# Patient Record
Sex: FEMALE | Race: OTHER | NOT HISPANIC OR LATINO | URBAN - METROPOLITAN AREA
[De-identification: names, ages, dates, MRNs, and addresses within clinical notes are randomized per-mention and may not be internally consistent; named-entity substitution may affect disease eponyms.]

---

## 2019-06-29 ENCOUNTER — EMERGENCY (EMERGENCY)
Facility: HOSPITAL | Age: 39
LOS: 1 days | Discharge: HOME | End: 2019-06-29
Attending: EMERGENCY MEDICINE | Admitting: EMERGENCY MEDICINE
Payer: SELF-PAY

## 2019-06-29 VITALS
RESPIRATION RATE: 18 BRPM | DIASTOLIC BLOOD PRESSURE: 75 MMHG | TEMPERATURE: 98 F | OXYGEN SATURATION: 100 % | SYSTOLIC BLOOD PRESSURE: 125 MMHG | HEART RATE: 108 BPM

## 2019-06-29 PROCEDURE — 99283 EMERGENCY DEPT VISIT LOW MDM: CPT | Mod: 25

## 2019-06-29 PROCEDURE — 76815 OB US LIMITED FETUS(S): CPT | Mod: 26

## 2019-06-29 PROCEDURE — 99053 MED SERV 10PM-8AM 24 HR FAC: CPT

## 2019-06-29 NOTE — ED ADULT TRIAGE NOTE - CHIEF COMPLAINT QUOTE
Pt Armenian speaking,  family chosen to translate.  Pt states she is 8 weeks pregnant .  Pt with vaginal bleeding and pelvic pain x 1 day.

## 2019-06-30 VITALS
SYSTOLIC BLOOD PRESSURE: 105 MMHG | OXYGEN SATURATION: 100 % | TEMPERATURE: 97 F | RESPIRATION RATE: 16 BRPM | HEART RATE: 86 BPM | DIASTOLIC BLOOD PRESSURE: 65 MMHG

## 2019-06-30 LAB
ALBUMIN SERPL ELPH-MCNC: 3.8 G/DL — SIGNIFICANT CHANGE UP (ref 3.3–5)
ALP SERPL-CCNC: 73 U/L — SIGNIFICANT CHANGE UP (ref 40–120)
ALT FLD-CCNC: 15 U/L — SIGNIFICANT CHANGE UP (ref 4–33)
ANION GAP SERPL CALC-SCNC: 14 MMO/L — SIGNIFICANT CHANGE UP (ref 7–14)
APPEARANCE UR: SIGNIFICANT CHANGE UP
APTT BLD: 25.2 SEC — LOW (ref 27.5–36.3)
AST SERPL-CCNC: 15 U/L — SIGNIFICANT CHANGE UP (ref 4–32)
BACTERIA # UR AUTO: NEGATIVE — SIGNIFICANT CHANGE UP
BASOPHILS # BLD AUTO: 0.05 K/UL — SIGNIFICANT CHANGE UP (ref 0–0.2)
BASOPHILS NFR BLD AUTO: 0.4 % — SIGNIFICANT CHANGE UP (ref 0–2)
BILIRUB SERPL-MCNC: 0.2 MG/DL — SIGNIFICANT CHANGE UP (ref 0.2–1.2)
BILIRUB UR-MCNC: NEGATIVE — SIGNIFICANT CHANGE UP
BLD GP AB SCN SERPL QL: NEGATIVE — SIGNIFICANT CHANGE UP
BLOOD UR QL VISUAL: SIGNIFICANT CHANGE UP
BUN SERPL-MCNC: 7 MG/DL — SIGNIFICANT CHANGE UP (ref 7–23)
CALCIUM SERPL-MCNC: 8.7 MG/DL — SIGNIFICANT CHANGE UP (ref 8.4–10.5)
CHLORIDE SERPL-SCNC: 101 MMOL/L — SIGNIFICANT CHANGE UP (ref 98–107)
CO2 SERPL-SCNC: 22 MMOL/L — SIGNIFICANT CHANGE UP (ref 22–31)
COLOR SPEC: SIGNIFICANT CHANGE UP
CREAT SERPL-MCNC: 0.52 MG/DL — SIGNIFICANT CHANGE UP (ref 0.5–1.3)
EOSINOPHIL # BLD AUTO: 0.14 K/UL — SIGNIFICANT CHANGE UP (ref 0–0.5)
EOSINOPHIL NFR BLD AUTO: 1.1 % — SIGNIFICANT CHANGE UP (ref 0–6)
GLUCOSE SERPL-MCNC: 183 MG/DL — HIGH (ref 70–99)
GLUCOSE UR-MCNC: >1000 — HIGH
HCG SERPL-ACNC: SIGNIFICANT CHANGE UP MIU/ML
HCT VFR BLD CALC: 35.3 % — SIGNIFICANT CHANGE UP (ref 34.5–45)
HGB BLD-MCNC: 11.2 G/DL — LOW (ref 11.5–15.5)
HYALINE CASTS # UR AUTO: NEGATIVE — SIGNIFICANT CHANGE UP
IMM GRANULOCYTES NFR BLD AUTO: 0.5 % — SIGNIFICANT CHANGE UP (ref 0–1.5)
INR BLD: 1 — SIGNIFICANT CHANGE UP (ref 0.88–1.17)
KETONES UR-MCNC: NEGATIVE — SIGNIFICANT CHANGE UP
LEUKOCYTE ESTERASE UR-ACNC: NEGATIVE — SIGNIFICANT CHANGE UP
LYMPHOCYTES # BLD AUTO: 2.65 K/UL — SIGNIFICANT CHANGE UP (ref 1–3.3)
LYMPHOCYTES # BLD AUTO: 20.8 % — SIGNIFICANT CHANGE UP (ref 13–44)
MCHC RBC-ENTMCNC: 25.5 PG — LOW (ref 27–34)
MCHC RBC-ENTMCNC: 31.7 % — LOW (ref 32–36)
MCV RBC AUTO: 80.4 FL — SIGNIFICANT CHANGE UP (ref 80–100)
MONOCYTES # BLD AUTO: 0.64 K/UL — SIGNIFICANT CHANGE UP (ref 0–0.9)
MONOCYTES NFR BLD AUTO: 5 % — SIGNIFICANT CHANGE UP (ref 2–14)
NEUTROPHILS # BLD AUTO: 9.18 K/UL — HIGH (ref 1.8–7.4)
NEUTROPHILS NFR BLD AUTO: 72.2 % — SIGNIFICANT CHANGE UP (ref 43–77)
NITRITE UR-MCNC: NEGATIVE — SIGNIFICANT CHANGE UP
NRBC # FLD: 0 K/UL — SIGNIFICANT CHANGE UP (ref 0–0)
PH UR: 7.5 — SIGNIFICANT CHANGE UP (ref 5–8)
PLATELET # BLD AUTO: 241 K/UL — SIGNIFICANT CHANGE UP (ref 150–400)
PMV BLD: 10.2 FL — SIGNIFICANT CHANGE UP (ref 7–13)
POTASSIUM SERPL-MCNC: 3.5 MMOL/L — SIGNIFICANT CHANGE UP (ref 3.5–5.3)
POTASSIUM SERPL-SCNC: 3.5 MMOL/L — SIGNIFICANT CHANGE UP (ref 3.5–5.3)
PROT SERPL-MCNC: 7.1 G/DL — SIGNIFICANT CHANGE UP (ref 6–8.3)
PROT UR-MCNC: 10 — SIGNIFICANT CHANGE UP
PROTHROM AB SERPL-ACNC: 11.1 SEC — SIGNIFICANT CHANGE UP (ref 9.8–13.1)
RBC # BLD: 4.39 M/UL — SIGNIFICANT CHANGE UP (ref 3.8–5.2)
RBC # FLD: 14.6 % — HIGH (ref 10.3–14.5)
RBC CASTS # UR COMP ASSIST: SIGNIFICANT CHANGE UP (ref 0–?)
RH IG SCN BLD-IMP: POSITIVE — SIGNIFICANT CHANGE UP
SODIUM SERPL-SCNC: 137 MMOL/L — SIGNIFICANT CHANGE UP (ref 135–145)
SP GR SPEC: 1.02 — SIGNIFICANT CHANGE UP (ref 1–1.04)
SQUAMOUS # UR AUTO: SIGNIFICANT CHANGE UP
UROBILINOGEN FLD QL: NORMAL — SIGNIFICANT CHANGE UP
WBC # BLD: 12.73 K/UL — HIGH (ref 3.8–10.5)
WBC # FLD AUTO: 12.73 K/UL — HIGH (ref 3.8–10.5)
WBC UR QL: SIGNIFICANT CHANGE UP (ref 0–?)

## 2019-06-30 NOTE — ED PROCEDURE NOTE - PROCEDURE ADDITIONAL DETAILS
Limited TA US of pregnancy shows a Live IUP measuring 9 weeks 4 days by CRL with FHB 171bpm. See images and report in chart.  Fish [id number]

## 2019-06-30 NOTE — ED PROVIDER NOTE - NS ED ROS FT
CONSTITUTIONAL: No fevers, no chills  Eyes: no visual changes  Ears: no ear drainage, no ear pain  Nose: no nasal congestion  Mouth/Throat: no sore throat  Cardiovascular: No Chest pain  Respiratory: No SOB  Gastrointestinal: No n/v/d, no abd pain  Genitourinary: vaginal bleeding.   SKIN: no rashes.  NEURO: no headache  PSYCHIATRIC: no known mental health issues.

## 2019-06-30 NOTE — ED PROVIDER NOTE - CLINICAL SUMMARY MEDICAL DECISION MAKING FREE TEXT BOX
First trimester vaginal bleeding - hemodynamically stable & well appearing, bedside sono with  +, will check basics + UA + type, anticipate d/c with GYN f/u .

## 2019-06-30 NOTE — ED PROVIDER NOTE - CARE PLAN
Principal Discharge DX:	Threatened miscarriage Principal Discharge DX:	Vaginal bleeding affecting early pregnancy

## 2019-06-30 NOTE — ED PROVIDER NOTE - OBJECTIVE STATEMENT
id: [id number]    37yo f  at ~ 8 wks p/w vaginal bleeding. Two pads today. + cramping. Previous sono during this pregnancy w/ IUP. See's Dr. Christianson. No known fevers, chills, urinary symptoms.

## 2019-06-30 NOTE — ED ADULT NURSE NOTE - CHIEF COMPLAINT QUOTE
Pt Turkmen speaking,  family chosen to translate.  Pt states she is 8 weeks pregnant .  Pt with vaginal bleeding and pelvic pain x 1 day.

## 2019-06-30 NOTE — ED PROVIDER NOTE - ATTENDING CONTRIBUTION TO CARE
Mccann: 38 yof  appx 8 weeks with IUP at gyn office. Pt complaining of abd crampy pain and vaginal bleeding today. Pain has improved now, no urinary sxs, no nausea, no vomiting. On exam, pt is well appearing, not pale, no distress, clear lungs, normal cardiac, abd soft and non tender, no cvat. LABS, US shows Live IUP, threatened ab - outpt gyn f/u, return precautions.

## 2019-06-30 NOTE — ED PROVIDER NOTE - NSFOLLOWUPINSTRUCTIONS_ED_ALL_ED_FT
Log Out.    [employer]® CareNotes®     :  [facility]             THREATENED MISCARRIAGE - AfterCare(R) Instructions(ER/ED)     Threatened Miscarriage    WHAT YOU NEED TO KNOW:    A threatened miscarriage occurs when you have vaginal bleeding within the first 20 weeks of pregnancy. It means that a miscarriage may happen. A threatened miscarriage may also be called a threatened .     DISCHARGE INSTRUCTIONS:    Return to the emergency department if:     You feel weak or faint.       Your pain or cramping in your abdomen or back gets worse.       You have vaginal bleeding that soaks 1 or more pads in an hour.       You pass material that looks like tissue or large clots.     Contact your healthcare provider or obstetrician if:     You have a fever.       You have trouble urinating, burning when you urinate, or feel a need to urinate often.      You have new or worsening vaginal bleeding.      You have vaginal pain or itching, or vaginal discharge that is yellow, green, or foul-smelling.       You have questions or concerns about your condition or care.    Self-care: The following may help you manage your symptoms and decrease your risk for a miscarriage:     Do not put anything in your vagina. Do not have sex, douche, or use tampons. These actions may increase your risk for infection and miscarriage.       Rest as directed. Do not exercise or do strenuous activities. These activities may cause  labor or miscarriage. Ask your healthcare provider what activities are okay to do.     Stay healthy during pregnancy:     Eat a variety of healthy foods. Healthy foods can help you get extra protein, water, and calories that you need while you are pregnant. Healthy foods include fruits, vegetables, whole-grain breads, low-fat dairy products, beans, lean meats, and fish. Avoid raw or undercooked meat and fish. Ask your healthcare provider if you need a special diet.       Take prenatal vitamins as directed. These help you get the right amount of vitamins and minerals. They may also decrease the risk of certain birth defects.      Do not drink alcohol or use illegal drugs. These can increase your risk for a miscarriage or harm your baby.       Do not smoke. Nicotine and other chemicals in cigarettes and cigars can harm your baby and cause miscarriage or  labor. Ask your healthcare provider for information if you currently smoke and need help to quit. E-cigarettes or smokeless tobacco still contain nicotine. Do not use these products.       Decrease your risk for an infection. Always wash your hands before eating or preparing meals. Do not spend time with people who are sick. Ask your healthcare provider if you need immunizations such as the flu or hepatitis B vaccine. Immunizations may decrease your risk for infections that could cause a miscarriage.       Manage your medical conditions. Keep your blood pressure and blood sugars under control. Maintain a healthy weight during pregnancy.     Follow up with your obstetrician as directed: You may need to see your obstetrician frequently for ultrasounds or blood tests. Write down your questions so you remember to ask them during your visits.       © Copyright [employer] 2019 All illustrations and images included in CareNotes are the copyrighted property of [employer] or [employer].      back to top                      © Copyright [employer] 2019

## 2019-06-30 NOTE — ED PROVIDER NOTE - PHYSICAL EXAMINATION
GEN - NAD; well appearing; A+O x3   HEAD - NC/AT     EYES - EOMI, no conjunctival pallor, no scleral icterus  ENT -   mucous membranes  moist , no discharge      NECK - Neck supple  PULM - CTA b/l,  symmetric breath sounds  COR -  RRR, S1 S2, no murmurs  ABD - , ND, NT, soft, no guarding, no rebound, no masses   BACK - no CVA tenderness, nontender spine     EXTREMS - no edema, no deformity, warm and well perfused    SKIN - no rash or bruising      NEUROLOGIC - alert, sensation nl, motor 5/5 RUE/LUE/RLE/LLE

## 2019-09-25 PROBLEM — Z00.00 ENCOUNTER FOR PREVENTIVE HEALTH EXAMINATION: Status: ACTIVE | Noted: 2019-09-25

## 2019-10-03 ENCOUNTER — APPOINTMENT (OUTPATIENT)
Dept: MATERNAL FETAL MEDICINE | Facility: CLINIC | Age: 39
End: 2019-10-03
Payer: MEDICAID

## 2019-10-03 ENCOUNTER — ASOB RESULT (OUTPATIENT)
Age: 39
End: 2019-10-03

## 2019-10-03 PROCEDURE — G0108 DIAB MANAGE TRN  PER INDIV: CPT

## 2019-10-21 ENCOUNTER — APPOINTMENT (OUTPATIENT)
Dept: MATERNAL FETAL MEDICINE | Facility: CLINIC | Age: 39
End: 2019-10-21
Payer: MEDICAID

## 2019-10-21 ENCOUNTER — APPOINTMENT (OUTPATIENT)
Dept: ANTEPARTUM | Facility: CLINIC | Age: 39
End: 2019-10-21
Payer: MEDICAID

## 2019-10-21 ENCOUNTER — ASOB RESULT (OUTPATIENT)
Age: 39
End: 2019-10-21

## 2019-10-21 DIAGNOSIS — O24.112 PRE-EXISTING TYPE 2 DIABETES MELLITUS, IN PREGNANCY, SECOND TRIMESTER: ICD-10-CM

## 2019-10-21 PROCEDURE — G0108 DIAB MANAGE TRN  PER INDIV: CPT

## 2019-10-21 PROCEDURE — 76811 OB US DETAILED SNGL FETUS: CPT

## 2019-10-28 RX ORDER — LANCETS
EACH MISCELLANEOUS
Qty: 2 | Refills: 0 | Status: ACTIVE | COMMUNITY
Start: 2019-10-21 | End: 1900-01-01

## 2019-10-28 RX ORDER — BLOOD SUGAR DIAGNOSTIC
STRIP MISCELLANEOUS 4 TIMES DAILY
Qty: 2 | Refills: 3 | Status: ACTIVE | COMMUNITY
Start: 2019-10-21 | End: 1900-01-01

## 2019-10-28 RX ORDER — INSULIN LISPRO 100 U/ML
100 INJECTION, SOLUTION SUBCUTANEOUS
Qty: 1 | Refills: 1 | Status: ACTIVE | COMMUNITY
Start: 2019-10-21 | End: 1900-01-01

## 2019-10-28 RX ORDER — INSULIN HUMAN 100 [IU]/ML
100 INJECTION, SUSPENSION SUBCUTANEOUS
Qty: 2 | Refills: 1 | Status: ACTIVE | COMMUNITY
Start: 2019-10-21 | End: 1900-01-01

## 2019-10-28 RX ORDER — PEN NEEDLE, DIABETIC 29 G X1/2"
32G X 4 MM NEEDLE, DISPOSABLE MISCELLANEOUS
Qty: 1 | Refills: 2 | Status: ACTIVE | COMMUNITY
Start: 2019-10-21 | End: 1900-01-01

## 2019-11-04 ENCOUNTER — APPOINTMENT (OUTPATIENT)
Dept: MATERNAL FETAL MEDICINE | Facility: CLINIC | Age: 39
End: 2019-11-04
Payer: MEDICAID

## 2019-11-04 ENCOUNTER — ASOB RESULT (OUTPATIENT)
Age: 39
End: 2019-11-04

## 2019-11-04 PROCEDURE — G0108 DIAB MANAGE TRN  PER INDIV: CPT

## 2019-11-18 ENCOUNTER — ASOB RESULT (OUTPATIENT)
Age: 39
End: 2019-11-18

## 2019-11-18 ENCOUNTER — APPOINTMENT (OUTPATIENT)
Dept: ANTEPARTUM | Facility: CLINIC | Age: 39
End: 2019-11-18
Payer: MEDICAID

## 2019-11-18 ENCOUNTER — APPOINTMENT (OUTPATIENT)
Dept: MATERNAL FETAL MEDICINE | Facility: CLINIC | Age: 39
End: 2019-11-18
Payer: MEDICAID

## 2019-11-18 PROCEDURE — G0108 DIAB MANAGE TRN  PER INDIV: CPT

## 2019-11-18 PROCEDURE — 76816 OB US FOLLOW-UP PER FETUS: CPT

## 2019-11-18 RX ORDER — ISOPROPYL ALCOHOL 0.7 ML/ML
SWAB TOPICAL
Qty: 2 | Refills: 2 | Status: ACTIVE | COMMUNITY
Start: 2019-10-21 | End: 1900-01-01

## 2019-11-29 ENCOUNTER — OUTPATIENT (OUTPATIENT)
Dept: OUTPATIENT SERVICES | Age: 39
LOS: 1 days | Discharge: HOME | End: 2019-11-29

## 2019-12-02 ENCOUNTER — APPOINTMENT (OUTPATIENT)
Dept: PEDIATRIC CARDIOLOGY | Facility: CLINIC | Age: 39
End: 2019-12-02

## 2019-12-09 ENCOUNTER — APPOINTMENT (OUTPATIENT)
Dept: ANTEPARTUM | Facility: CLINIC | Age: 39
End: 2019-12-09
Payer: MEDICAID

## 2019-12-09 ENCOUNTER — APPOINTMENT (OUTPATIENT)
Dept: MATERNAL FETAL MEDICINE | Facility: CLINIC | Age: 39
End: 2019-12-09
Payer: MEDICAID

## 2019-12-09 ENCOUNTER — OUTPATIENT (OUTPATIENT)
Dept: OUTPATIENT SERVICES | Facility: HOSPITAL | Age: 39
LOS: 1 days | End: 2019-12-09

## 2019-12-09 ENCOUNTER — ASOB RESULT (OUTPATIENT)
Age: 39
End: 2019-12-09

## 2019-12-09 PROCEDURE — 59025 FETAL NON-STRESS TEST: CPT | Mod: 26

## 2019-12-09 PROCEDURE — G0108 DIAB MANAGE TRN  PER INDIV: CPT

## 2019-12-09 PROCEDURE — 76816 OB US FOLLOW-UP PER FETUS: CPT

## 2019-12-16 ENCOUNTER — ASOB RESULT (OUTPATIENT)
Age: 39
End: 2019-12-16

## 2019-12-16 ENCOUNTER — APPOINTMENT (OUTPATIENT)
Dept: ANTEPARTUM | Facility: CLINIC | Age: 39
End: 2019-12-16
Payer: MEDICAID

## 2019-12-16 ENCOUNTER — APPOINTMENT (OUTPATIENT)
Dept: ANTEPARTUM | Facility: HOSPITAL | Age: 39
End: 2019-12-16

## 2019-12-16 ENCOUNTER — OUTPATIENT (OUTPATIENT)
Dept: OUTPATIENT SERVICES | Facility: HOSPITAL | Age: 39
LOS: 1 days | End: 2019-12-16

## 2019-12-16 PROCEDURE — 76818 FETAL BIOPHYS PROFILE W/NST: CPT | Mod: 26

## 2019-12-17 ENCOUNTER — APPOINTMENT (OUTPATIENT)
Dept: PEDIATRIC CARDIOLOGY | Facility: CLINIC | Age: 39
End: 2019-12-17
Payer: MEDICAID

## 2019-12-17 PROCEDURE — 76820 UMBILICAL ARTERY ECHO: CPT

## 2019-12-17 PROCEDURE — 76825 ECHO EXAM OF FETAL HEART: CPT

## 2019-12-17 PROCEDURE — 93325 DOPPLER ECHO COLOR FLOW MAPG: CPT | Mod: 59

## 2019-12-17 PROCEDURE — 99203 OFFICE O/P NEW LOW 30 MIN: CPT | Mod: 25

## 2019-12-17 PROCEDURE — 76827 ECHO EXAM OF FETAL HEART: CPT

## 2019-12-23 ENCOUNTER — ASOB RESULT (OUTPATIENT)
Age: 39
End: 2019-12-23

## 2019-12-23 ENCOUNTER — OUTPATIENT (OUTPATIENT)
Dept: OUTPATIENT SERVICES | Facility: HOSPITAL | Age: 39
LOS: 1 days | End: 2019-12-23

## 2019-12-23 ENCOUNTER — APPOINTMENT (OUTPATIENT)
Dept: ANTEPARTUM | Facility: CLINIC | Age: 39
End: 2019-12-23
Payer: MEDICAID

## 2019-12-23 ENCOUNTER — APPOINTMENT (OUTPATIENT)
Dept: ANTEPARTUM | Facility: HOSPITAL | Age: 39
End: 2019-12-23

## 2019-12-23 PROCEDURE — 76818 FETAL BIOPHYS PROFILE W/NST: CPT | Mod: 26

## 2019-12-30 ENCOUNTER — APPOINTMENT (OUTPATIENT)
Dept: ANTEPARTUM | Facility: CLINIC | Age: 39
End: 2019-12-30

## 2019-12-30 ENCOUNTER — APPOINTMENT (OUTPATIENT)
Dept: ANTEPARTUM | Facility: HOSPITAL | Age: 39
End: 2019-12-30

## 2020-01-03 DIAGNOSIS — O24.414 GESTATIONAL DIABETES MELLITUS IN PREGNANCY, INSULIN CONTROLLED: ICD-10-CM

## 2020-01-03 DIAGNOSIS — Z3A.32 32 WEEKS GESTATION OF PREGNANCY: ICD-10-CM

## 2020-01-03 DIAGNOSIS — O09.523 SUPERVISION OF ELDERLY MULTIGRAVIDA, THIRD TRIMESTER: ICD-10-CM

## 2020-01-06 ENCOUNTER — ASOB RESULT (OUTPATIENT)
Age: 40
End: 2020-01-06

## 2020-01-06 ENCOUNTER — APPOINTMENT (OUTPATIENT)
Dept: ANTEPARTUM | Facility: HOSPITAL | Age: 40
End: 2020-01-06

## 2020-01-06 ENCOUNTER — APPOINTMENT (OUTPATIENT)
Dept: ANTEPARTUM | Facility: CLINIC | Age: 40
End: 2020-01-06
Payer: MEDICAID

## 2020-01-06 ENCOUNTER — OUTPATIENT (OUTPATIENT)
Dept: OUTPATIENT SERVICES | Facility: HOSPITAL | Age: 40
LOS: 1 days | End: 2020-01-06

## 2020-01-06 PROCEDURE — 76816 OB US FOLLOW-UP PER FETUS: CPT

## 2020-01-06 PROCEDURE — 76818 FETAL BIOPHYS PROFILE W/NST: CPT | Mod: 26

## 2020-01-08 DIAGNOSIS — O24.414 GESTATIONAL DIABETES MELLITUS IN PREGNANCY, INSULIN CONTROLLED: ICD-10-CM

## 2020-01-08 DIAGNOSIS — O09.523 SUPERVISION OF ELDERLY MULTIGRAVIDA, THIRD TRIMESTER: ICD-10-CM

## 2020-01-08 DIAGNOSIS — Z3A.33 33 WEEKS GESTATION OF PREGNANCY: ICD-10-CM

## 2020-01-09 ENCOUNTER — APPOINTMENT (OUTPATIENT)
Dept: ANTEPARTUM | Facility: HOSPITAL | Age: 40
End: 2020-01-09
Payer: MEDICAID

## 2020-01-09 ENCOUNTER — ASOB RESULT (OUTPATIENT)
Age: 40
End: 2020-01-09

## 2020-01-09 ENCOUNTER — OUTPATIENT (OUTPATIENT)
Dept: OUTPATIENT SERVICES | Facility: HOSPITAL | Age: 40
LOS: 1 days | End: 2020-01-09

## 2020-01-09 DIAGNOSIS — O24.414 GESTATIONAL DIABETES MELLITUS IN PREGNANCY, INSULIN CONTROLLED: ICD-10-CM

## 2020-01-09 DIAGNOSIS — Z3A.34 34 WEEKS GESTATION OF PREGNANCY: ICD-10-CM

## 2020-01-09 DIAGNOSIS — O09.523 SUPERVISION OF ELDERLY MULTIGRAVIDA, THIRD TRIMESTER: ICD-10-CM

## 2020-01-09 PROCEDURE — 76818 FETAL BIOPHYS PROFILE W/NST: CPT | Mod: 26

## 2020-01-13 ENCOUNTER — ASOB RESULT (OUTPATIENT)
Age: 40
End: 2020-01-13

## 2020-01-13 ENCOUNTER — APPOINTMENT (OUTPATIENT)
Dept: ANTEPARTUM | Facility: CLINIC | Age: 40
End: 2020-01-13
Payer: MEDICAID

## 2020-01-13 ENCOUNTER — APPOINTMENT (OUTPATIENT)
Dept: ANTEPARTUM | Facility: HOSPITAL | Age: 40
End: 2020-01-13

## 2020-01-13 ENCOUNTER — OUTPATIENT (OUTPATIENT)
Dept: OUTPATIENT SERVICES | Facility: HOSPITAL | Age: 40
LOS: 1 days | End: 2020-01-13

## 2020-01-13 PROCEDURE — 59025 FETAL NON-STRESS TEST: CPT | Mod: 26

## 2020-01-16 ENCOUNTER — APPOINTMENT (OUTPATIENT)
Dept: ANTEPARTUM | Facility: HOSPITAL | Age: 40
End: 2020-01-16

## 2020-01-16 DIAGNOSIS — O24.414 GESTATIONAL DIABETES MELLITUS IN PREGNANCY, INSULIN CONTROLLED: ICD-10-CM

## 2020-01-16 DIAGNOSIS — Z3A.37 37 WEEKS GESTATION OF PREGNANCY: ICD-10-CM

## 2020-01-16 DIAGNOSIS — O09.523 SUPERVISION OF ELDERLY MULTIGRAVIDA, THIRD TRIMESTER: ICD-10-CM

## 2020-01-21 ENCOUNTER — APPOINTMENT (OUTPATIENT)
Dept: ANTEPARTUM | Facility: CLINIC | Age: 40
End: 2020-01-21
Payer: MEDICAID

## 2020-01-21 ENCOUNTER — ASOB RESULT (OUTPATIENT)
Age: 40
End: 2020-01-21

## 2020-01-21 ENCOUNTER — OUTPATIENT (OUTPATIENT)
Dept: OUTPATIENT SERVICES | Facility: HOSPITAL | Age: 40
LOS: 1 days | End: 2020-01-21

## 2020-01-21 ENCOUNTER — APPOINTMENT (OUTPATIENT)
Dept: ANTEPARTUM | Facility: HOSPITAL | Age: 40
End: 2020-01-21

## 2020-01-21 VITALS
RESPIRATION RATE: 14 BRPM | HEIGHT: 64.5 IN | DIASTOLIC BLOOD PRESSURE: 84 MMHG | WEIGHT: 177.03 LBS | OXYGEN SATURATION: 99 % | HEART RATE: 83 BPM | SYSTOLIC BLOOD PRESSURE: 122 MMHG | TEMPERATURE: 99 F

## 2020-01-21 DIAGNOSIS — O09.523 SUPERVISION OF ELDERLY MULTIGRAVIDA, THIRD TRIMESTER: ICD-10-CM

## 2020-01-21 DIAGNOSIS — Z34.90 ENCOUNTER FOR SUPERVISION OF NORMAL PREGNANCY, UNSPECIFIED, UNSPECIFIED TRIMESTER: ICD-10-CM

## 2020-01-21 DIAGNOSIS — O09.93 SUPERVISION OF HIGH RISK PREGNANCY, UNSPECIFIED, THIRD TRIMESTER: ICD-10-CM

## 2020-01-21 DIAGNOSIS — Z98.891 HISTORY OF UTERINE SCAR FROM PREVIOUS SURGERY: Chronic | ICD-10-CM

## 2020-01-21 DIAGNOSIS — O24.414 GESTATIONAL DIABETES MELLITUS IN PREGNANCY, INSULIN CONTROLLED: ICD-10-CM

## 2020-01-21 LAB
ANION GAP SERPL CALC-SCNC: 9 MMO/L — SIGNIFICANT CHANGE UP (ref 7–14)
APPEARANCE UR: SIGNIFICANT CHANGE UP
BACTERIA # UR AUTO: SIGNIFICANT CHANGE UP
BILIRUB UR-MCNC: NEGATIVE — SIGNIFICANT CHANGE UP
BLD GP AB SCN SERPL QL: NEGATIVE — SIGNIFICANT CHANGE UP
BLOOD UR QL VISUAL: NEGATIVE — SIGNIFICANT CHANGE UP
BUN SERPL-MCNC: 9 MG/DL — SIGNIFICANT CHANGE UP (ref 7–23)
CALCIUM SERPL-MCNC: 9 MG/DL — SIGNIFICANT CHANGE UP (ref 8.4–10.5)
CHLORIDE SERPL-SCNC: 104 MMOL/L — SIGNIFICANT CHANGE UP (ref 98–107)
CO2 SERPL-SCNC: 23 MMOL/L — SIGNIFICANT CHANGE UP (ref 22–31)
COLOR SPEC: SIGNIFICANT CHANGE UP
CREAT SERPL-MCNC: 0.64 MG/DL — SIGNIFICANT CHANGE UP (ref 0.5–1.3)
GLUCOSE SERPL-MCNC: 116 MG/DL — HIGH (ref 70–99)
GLUCOSE UR-MCNC: NEGATIVE — SIGNIFICANT CHANGE UP
HCT VFR BLD CALC: 40 % — SIGNIFICANT CHANGE UP (ref 34.5–45)
HGB BLD-MCNC: 12.3 G/DL — SIGNIFICANT CHANGE UP (ref 11.5–15.5)
HYALINE CASTS # UR AUTO: NEGATIVE — SIGNIFICANT CHANGE UP
KETONES UR-MCNC: NEGATIVE — SIGNIFICANT CHANGE UP
LEUKOCYTE ESTERASE UR-ACNC: NEGATIVE — SIGNIFICANT CHANGE UP
MCHC RBC-ENTMCNC: 24.2 PG — LOW (ref 27–34)
MCHC RBC-ENTMCNC: 30.8 % — LOW (ref 32–36)
MCV RBC AUTO: 78.7 FL — LOW (ref 80–100)
NITRITE UR-MCNC: NEGATIVE — SIGNIFICANT CHANGE UP
NRBC # FLD: 0 K/UL — SIGNIFICANT CHANGE UP (ref 0–0)
PH UR: 6.5 — SIGNIFICANT CHANGE UP (ref 5–8)
PLATELET # BLD AUTO: 247 K/UL — SIGNIFICANT CHANGE UP (ref 150–400)
PMV BLD: 10.3 FL — SIGNIFICANT CHANGE UP (ref 7–13)
POTASSIUM SERPL-MCNC: 4.2 MMOL/L — SIGNIFICANT CHANGE UP (ref 3.5–5.3)
POTASSIUM SERPL-SCNC: 4.2 MMOL/L — SIGNIFICANT CHANGE UP (ref 3.5–5.3)
PROT UR-MCNC: NEGATIVE — SIGNIFICANT CHANGE UP
RBC # BLD: 5.08 M/UL — SIGNIFICANT CHANGE UP (ref 3.8–5.2)
RBC # FLD: 14.1 % — SIGNIFICANT CHANGE UP (ref 10.3–14.5)
RBC CASTS # UR COMP ASSIST: SIGNIFICANT CHANGE UP (ref 0–?)
RH IG SCN BLD-IMP: POSITIVE — SIGNIFICANT CHANGE UP
SODIUM SERPL-SCNC: 136 MMOL/L — SIGNIFICANT CHANGE UP (ref 135–145)
SP GR SPEC: 1.01 — SIGNIFICANT CHANGE UP (ref 1–1.04)
SQUAMOUS # UR AUTO: SIGNIFICANT CHANGE UP
UROBILINOGEN FLD QL: NORMAL — SIGNIFICANT CHANGE UP
WBC # BLD: 10.64 K/UL — HIGH (ref 3.8–10.5)
WBC # FLD AUTO: 10.64 K/UL — HIGH (ref 3.8–10.5)
WBC UR QL: SIGNIFICANT CHANGE UP (ref 0–?)

## 2020-01-21 PROCEDURE — 76818 FETAL BIOPHYS PROFILE W/NST: CPT | Mod: 26

## 2020-01-21 NOTE — OB PST NOTE - PMH
Insulin controlled gestational diabetes mellitus (GDM) in first trimester    Miscarriage    Pregnancy    Sciatica of right side

## 2020-01-21 NOTE — OB PST NOTE - NSHPREVIEWOFSYSTEMS_GEN_ALL_CORE
General: No fever, chills, sweating, anorexia, weight loss or weight gain. No polyphagia, polyurea, polydypsia, malaise, or fatigue    Skin: No rashes, itching, or dryness. No change in size/color of moles. No tumors, brittle nails, pitted nails, or hair loss    Breast: No tenderness, lumps, or nipple discharge      Ophthalmologic: No diplopia, photophobia, lacrimation, blurred Vision , or eye discharge    ENMT Symptoms: No hearing difficulty, ear pain, tinnitus, or vertigo. No sinus symptoms, nasal congestion, nasal   discharge, or nasal obstruction    Respiratory and Thorax: No wheezing, dyspnea, cough, hemoptysis, or pleuritic chest pain     Cardiovascular: No chest pain, palpitations, dyspnea on exertion, orthopnea, paroxysmal nocturnal dyspnea,   peripheral edema, or claudication    Gastrointestinal: No nausea, vomiting, diarrhea, constipation, change in bowel habits, flatulence, abdominal pain, or melena    Genitourinary/ Pelvis: No hematuria, renal colic, or flank pain.  No urine discoloration, incontinence, dysuria, or urinary hesitancy. Normal urinary frequency. No nocturia, abnormal vaginal bleeding, vaginal discharge, spotting, pelvic pain, or vaginal leakage    Musculoskeletal: Pain and numbness of bilateral hands, states her OB prescribed calcium supplement No arthralgia, arthritis, joint swelling, muscle cramping, muscle weakness, neck pain, arm pain, or leg pain    Neurological: Occasional numbness of both hands No transient paralysis, weakness, paresthesias, or seizures. No syncope, tremors, vertigo, loss of sensation, difficulty walking, loss of consciousness, hemiparesis, confusion, or facial palsy    Psychiatric: No suicidal ideation, depression, anxiety, insomnia, memory loss, paranoia, mood swings, agitation, hallucinations, or hyperactivity    Hematology: No gum bleeding, nose bleeding, or skin lumps    Lymphatic: No enlarged or tender lymph nodes. No extremity swelling    Endocrine: Gestational DM preprandial 76-95 mg/dl, post prandial 110-140 mg/dl No heat or cold intolerance    Immunologic: No recurrent or persistent infections General: No fever, chills, sweating, anorexia, weight loss or weight gain. No polyphagia, polyurea, polydypsia, malaise, or fatigue    Skin: No rashes, itching, or dryness. No change in size/color of moles. No tumors, brittle nails, pitted nails, or hair loss    Breast: No tenderness, lumps, or nipple discharge      Ophthalmologic: No diplopia, photophobia, lacrimation, blurred Vision , or eye discharge    ENMT Symptoms: No hearing difficulty, ear pain, tinnitus, or vertigo. No sinus symptoms, nasal congestion, nasal   discharge, or nasal obstruction    Respiratory and Thorax: No wheezing, dyspnea, cough, hemoptysis, or pleuritic chest pain     Cardiovascular: No chest pain, palpitations, dyspnea on exertion, orthopnea, paroxysmal nocturnal dyspnea,   peripheral edema, or claudication    Gastrointestinal: No nausea, vomiting, diarrhea, constipation, change in bowel habits, flatulence, abdominal pain, or melena    Genitourinary/ Pelvis: No hematuria, renal colic, or flank pain.  No urine discoloration, incontinence, dysuria, or urinary hesitancy. Normal urinary frequency. No nocturia, abnormal vaginal bleeding, vaginal discharge, spotting, pelvic pain, or vaginal leakage    Musculoskeletal: Pain and stiffness of bilateral hands, states her OB prescribed calcium supplement, mild improvement  No arthralgia, arthritis, joint swelling, muscle cramping, muscle weakness, neck pain, arm pain, or leg pain    Neurological: No transient paralysis, weakness, paresthesias, or seizures. No syncope, tremors, vertigo, loss of sensation, difficulty walking, loss of consciousness, hemiparesis, confusion, or facial palsy    Psychiatric: No suicidal ideation, depression, anxiety, insomnia, memory loss, paranoia, mood swings, agitation, hallucinations, or hyperactivity    Hematology: No gum bleeding, nose bleeding, or skin lumps    Lymphatic: No enlarged or tender lymph nodes. No extremity swelling    Endocrine: Gestational DM preprandial 76-95 mg/dl, post prandial 110-140 mg/dl No heat or cold intolerance    Immunologic: No recurrent or persistent infections

## 2020-01-21 NOTE — OB PST NOTE - FAMILY HISTORY
Father  Still living? Unknown  Family history of diabetes mellitus, Age at diagnosis: Age Unknown  Family history of heart disease, Age at diagnosis: Age Unknown

## 2020-01-21 NOTE — OB PST NOTE - PROBLEM SELECTOR PLAN 1
Assessment and Plan: Patient scheduled for  section  Patient provided with verbal and written presurgical instructions; verbalized understanding  with teach back.    Patient provided with Chlorhexidine wash, verbal and written instructions reviewed. Patient demonstrated understanding with teach back.     STOP BANG score met, OR booking notified  OR booking notified of gestational DM  POCT glucose testing on admission    Patient instructed to d/c prenatal vitamins 20

## 2020-01-21 NOTE — OB PST NOTE - NSHPPHYSICALEXAM_GEN_ALL_CORE
Constitutional: Well Developed, Well Groomed, Well Nourished, No Distress    Eyes: PERRL, EOMI, conjunctiva clear    Ears: Normal    Mouth & Gums: Normal, moist    Pharynx: No tenderness, discharge, or peritonsillar abscess    Tonsils: No Redness, discharge, tenderness, or swelling    Neck: Supple, no JVD, normal thyroid glands, no carotid bruits, no cervical vertebral or paraspinal tenderness    Breast: Patient declines    Back: Normal shape, ROM intact, strength intact, no vertebral tenderness    Respiratory: Airway patent, breath sounds equal, good air movement, respiration non-labored, clear to auscultation bilateral, no chest wall tenderness, no intercostal retractions, no rales, no wheezes, no rhonchi, no subcutaneous emphysema    Cardiovascular:  Regular rate and rhythm, no rubs or murmur, normal PMI    Gastrointestinal: Gravid abdomen Soft, non-tender, non distention, no masses palpable, bowel sound normal    Extremities: No clubbing, cyanosis, or pedal edema    Vascular:  Radial Pulse normal,  DP pulse normal, PT pulse normal    Neurological: alert & oriented x 3, sensation intact, cranial nerve intact, normal strength    Skin: warm and dry, normal color    Lymph Nodes: normal posterior cervical lymph node, normal anterior cervical lymph node, normal supraclavicular lymph node,     Musculoskeletal: ROM intact, no joint swelling, warmth, or calf tenderness. Normal strength    Psychiatric: normal affect, normal behavior

## 2020-01-21 NOTE — OB PST NOTE - NSANTHOSAYNRD_GEN_A_CORE
No. JONATHON screening performed.  STOP BANG Legend: 0-2 = LOW Risk; 3-4 = INTERMEDIATE Risk; 5-8 = HIGH Risk

## 2020-01-21 NOTE — OB PST NOTE - HISTORY OF PRESENT ILLNESS
39 year old pregnant female reports good fetal movement. LMP   2019 ; JESSICA  2020 ; G 4 P  2  Denies abnormal pelvic pain, back pain, vaginal bleeding, or leakage of amniotic fluid. Patient presents to PST for evaluation, scheduled for repreat  section on  2020

## 2020-01-22 LAB — T PALLIDUM AB TITR SER: NEGATIVE — SIGNIFICANT CHANGE UP

## 2020-01-23 ENCOUNTER — APPOINTMENT (OUTPATIENT)
Dept: ANTEPARTUM | Facility: HOSPITAL | Age: 40
End: 2020-01-23

## 2020-01-23 ENCOUNTER — TRANSCRIPTION ENCOUNTER (OUTPATIENT)
Age: 40
End: 2020-01-23

## 2020-01-23 DIAGNOSIS — O24.414 GESTATIONAL DIABETES MELLITUS IN PREGNANCY, INSULIN CONTROLLED: ICD-10-CM

## 2020-01-23 DIAGNOSIS — O09.523 SUPERVISION OF ELDERLY MULTIGRAVIDA, THIRD TRIMESTER: ICD-10-CM

## 2020-01-23 PROBLEM — Z78.9 OTHER SPECIFIED HEALTH STATUS: Chronic | Status: INACTIVE | Noted: 2019-06-30 | Resolved: 2020-01-21

## 2020-01-24 ENCOUNTER — INPATIENT (INPATIENT)
Facility: HOSPITAL | Age: 40
LOS: 2 days | Discharge: HOME | End: 2020-01-27
Attending: SPECIALIST | Admitting: SPECIALIST
Payer: MEDICAID

## 2020-01-24 ENCOUNTER — RESULT REVIEW (OUTPATIENT)
Age: 40
End: 2020-01-24

## 2020-01-24 VITALS — HEART RATE: 83 BPM | DIASTOLIC BLOOD PRESSURE: 79 MMHG | SYSTOLIC BLOOD PRESSURE: 122 MMHG

## 2020-01-24 DIAGNOSIS — O09.93 SUPERVISION OF HIGH RISK PREGNANCY, UNSPECIFIED, THIRD TRIMESTER: ICD-10-CM

## 2020-01-24 DIAGNOSIS — Z98.891 HISTORY OF UTERINE SCAR FROM PREVIOUS SURGERY: Chronic | ICD-10-CM

## 2020-01-24 LAB
BLD GP AB SCN SERPL QL: NEGATIVE — SIGNIFICANT CHANGE UP
GLUCOSE BLDC GLUCOMTR-MCNC: 88 MG/DL — SIGNIFICANT CHANGE UP (ref 70–99)
HCT VFR BLD CALC: 41.3 % — SIGNIFICANT CHANGE UP (ref 34.5–45)
HGB BLD-MCNC: 12.9 G/DL — SIGNIFICANT CHANGE UP (ref 11.5–15.5)
MCHC RBC-ENTMCNC: 24.5 PG — LOW (ref 27–34)
MCHC RBC-ENTMCNC: 31.2 % — LOW (ref 32–36)
MCV RBC AUTO: 78.4 FL — LOW (ref 80–100)
NRBC # FLD: 0 K/UL — SIGNIFICANT CHANGE UP (ref 0–0)
PLATELET # BLD AUTO: 241 K/UL — SIGNIFICANT CHANGE UP (ref 150–400)
PMV BLD: 9.5 FL — SIGNIFICANT CHANGE UP (ref 7–13)
RBC # BLD: 5.27 M/UL — HIGH (ref 3.8–5.2)
RBC # FLD: 14 % — SIGNIFICANT CHANGE UP (ref 10.3–14.5)
RH IG SCN BLD-IMP: POSITIVE — SIGNIFICANT CHANGE UP
WBC # BLD: 10.02 K/UL — SIGNIFICANT CHANGE UP (ref 3.8–10.5)
WBC # FLD AUTO: 10.02 K/UL — SIGNIFICANT CHANGE UP (ref 3.8–10.5)

## 2020-01-24 PROCEDURE — 88302 TISSUE EXAM BY PATHOLOGIST: CPT | Mod: 26

## 2020-01-24 RX ORDER — OXYCODONE HYDROCHLORIDE 5 MG/1
5 TABLET ORAL
Refills: 0 | Status: DISCONTINUED | OUTPATIENT
Start: 2020-01-24 | End: 2020-01-27

## 2020-01-24 RX ORDER — HYDROMORPHONE HYDROCHLORIDE 2 MG/ML
0.5 INJECTION INTRAMUSCULAR; INTRAVENOUS; SUBCUTANEOUS
Refills: 0 | Status: DISCONTINUED | OUTPATIENT
Start: 2020-01-24 | End: 2020-01-24

## 2020-01-24 RX ORDER — ACETAMINOPHEN 500 MG
975 TABLET ORAL
Refills: 0 | Status: DISCONTINUED | OUTPATIENT
Start: 2020-01-24 | End: 2020-01-27

## 2020-01-24 RX ORDER — SIMETHICONE 80 MG/1
80 TABLET, CHEWABLE ORAL EVERY 4 HOURS
Refills: 0 | Status: DISCONTINUED | OUTPATIENT
Start: 2020-01-24 | End: 2020-01-27

## 2020-01-24 RX ORDER — MAGNESIUM HYDROXIDE 400 MG/1
30 TABLET, CHEWABLE ORAL
Refills: 0 | Status: DISCONTINUED | OUTPATIENT
Start: 2020-01-24 | End: 2020-01-27

## 2020-01-24 RX ORDER — METOCLOPRAMIDE HCL 10 MG
10 TABLET ORAL ONCE
Refills: 0 | Status: COMPLETED | OUTPATIENT
Start: 2020-01-24 | End: 2020-01-24

## 2020-01-24 RX ORDER — OXYCODONE HYDROCHLORIDE 5 MG/1
5 TABLET ORAL ONCE
Refills: 0 | Status: DISCONTINUED | OUTPATIENT
Start: 2020-01-24 | End: 2020-01-27

## 2020-01-24 RX ORDER — SODIUM CHLORIDE 9 MG/ML
1000 INJECTION, SOLUTION INTRAVENOUS
Refills: 0 | Status: DISCONTINUED | OUTPATIENT
Start: 2020-01-24 | End: 2020-01-25

## 2020-01-24 RX ORDER — LANOLIN
1 OINTMENT (GRAM) TOPICAL EVERY 6 HOURS
Refills: 0 | Status: DISCONTINUED | OUTPATIENT
Start: 2020-01-24 | End: 2020-01-27

## 2020-01-24 RX ORDER — OXYTOCIN 10 UNIT/ML
41.67 VIAL (ML) INJECTION
Qty: 20 | Refills: 0 | Status: DISCONTINUED | OUTPATIENT
Start: 2020-01-24 | End: 2020-01-25

## 2020-01-24 RX ORDER — GLYCERIN ADULT
1 SUPPOSITORY, RECTAL RECTAL AT BEDTIME
Refills: 0 | Status: DISCONTINUED | OUTPATIENT
Start: 2020-01-24 | End: 2020-01-27

## 2020-01-24 RX ORDER — CITRIC ACID/SODIUM CITRATE 300-500 MG
30 SOLUTION, ORAL ORAL ONCE
Refills: 0 | Status: COMPLETED | OUTPATIENT
Start: 2020-01-24 | End: 2020-01-24

## 2020-01-24 RX ORDER — OXYCODONE HYDROCHLORIDE 5 MG/1
10 TABLET ORAL
Refills: 0 | Status: DISCONTINUED | OUTPATIENT
Start: 2020-01-24 | End: 2020-01-25

## 2020-01-24 RX ORDER — TETANUS TOXOID, REDUCED DIPHTHERIA TOXOID AND ACELLULAR PERTUSSIS VACCINE, ADSORBED 5; 2.5; 8; 8; 2.5 [IU]/.5ML; [IU]/.5ML; UG/.5ML; UG/.5ML; UG/.5ML
0.5 SUSPENSION INTRAMUSCULAR ONCE
Refills: 0 | Status: DISCONTINUED | OUTPATIENT
Start: 2020-01-24 | End: 2020-01-27

## 2020-01-24 RX ORDER — SODIUM CHLORIDE 9 MG/ML
1000 INJECTION, SOLUTION INTRAVENOUS
Refills: 0 | Status: DISCONTINUED | OUTPATIENT
Start: 2020-01-24 | End: 2020-01-24

## 2020-01-24 RX ORDER — IBUPROFEN 200 MG
600 TABLET ORAL EVERY 6 HOURS
Refills: 0 | Status: COMPLETED | OUTPATIENT
Start: 2020-01-24 | End: 2020-12-22

## 2020-01-24 RX ORDER — SODIUM CHLORIDE 9 MG/ML
1000 INJECTION, SOLUTION INTRAVENOUS ONCE
Refills: 0 | Status: COMPLETED | OUTPATIENT
Start: 2020-01-24 | End: 2020-01-24

## 2020-01-24 RX ORDER — FAMOTIDINE 10 MG/ML
20 INJECTION INTRAVENOUS ONCE
Refills: 0 | Status: COMPLETED | OUTPATIENT
Start: 2020-01-24 | End: 2020-01-24

## 2020-01-24 RX ORDER — DIPHENHYDRAMINE HCL 50 MG
25 CAPSULE ORAL EVERY 6 HOURS
Refills: 0 | Status: DISCONTINUED | OUTPATIENT
Start: 2020-01-24 | End: 2020-01-27

## 2020-01-24 RX ORDER — HEPARIN SODIUM 5000 [USP'U]/ML
5000 INJECTION INTRAVENOUS; SUBCUTANEOUS EVERY 12 HOURS
Refills: 0 | Status: DISCONTINUED | OUTPATIENT
Start: 2020-01-24 | End: 2020-01-27

## 2020-01-24 RX ORDER — KETOROLAC TROMETHAMINE 30 MG/ML
30 SYRINGE (ML) INJECTION EVERY 6 HOURS
Refills: 0 | Status: DISCONTINUED | OUTPATIENT
Start: 2020-01-24 | End: 2020-01-25

## 2020-01-24 RX ADMIN — SODIUM CHLORIDE 2000 MILLILITER(S): 9 INJECTION, SOLUTION INTRAVENOUS at 07:29

## 2020-01-24 RX ADMIN — Medication 975 MILLIGRAM(S): at 12:36

## 2020-01-24 RX ADMIN — Medication 975 MILLIGRAM(S): at 13:07

## 2020-01-24 RX ADMIN — Medication 10 MILLIGRAM(S): at 07:29

## 2020-01-24 RX ADMIN — Medication 30 MILLILITER(S): at 07:29

## 2020-01-24 RX ADMIN — Medication 125 MILLIUNIT(S)/MIN: at 10:44

## 2020-01-24 RX ADMIN — Medication 30 MILLIGRAM(S): at 16:32

## 2020-01-24 RX ADMIN — Medication 30 MILLIGRAM(S): at 17:02

## 2020-01-24 RX ADMIN — HEPARIN SODIUM 5000 UNIT(S): 5000 INJECTION INTRAVENOUS; SUBCUTANEOUS at 16:33

## 2020-01-24 RX ADMIN — SODIUM CHLORIDE 75 MILLILITER(S): 9 INJECTION, SOLUTION INTRAVENOUS at 10:44

## 2020-01-24 RX ADMIN — FAMOTIDINE 20 MILLIGRAM(S): 10 INJECTION INTRAVENOUS at 07:29

## 2020-01-24 NOTE — OB PROVIDER H&P - NSHPLABSRESULTS_GEN_ALL_CORE
Type + Screen (01.21.20 @ 17:56)    ABO Interpretation: B    Rh Interpretation: Positive    Antibody Screen: Negative    Complete Blood Count STAT (01.24.20 @ 06:40)    WBC Count: 10.02 K/uL    Hemoglobin: 12.9 g/dL    Hematocrit: 41.3 %    Platelet Count - Automated: 241 K/uL    Finger stick this am: 66

## 2020-01-24 NOTE — OB PROVIDER H&P - ASSESSMENT
40 yo [employer] Female  at 39w1d  presents for repeat scheduled  section with bilateral tubal ligation. Pt states +fm, denies any vaginal bleeding or LOF.    Admit to L&D  Routine labs/nst/ivf @200cc/hr  D5LR for finger stick <100  Pepcid, Reglan, Bicitra  prenatal record summary available  prenatal work sheet reviewed  pt willing to accept blood if needed, consents obtained  finger stick on admission  Dr. Blackwood informed  Anesthesia consult for pain management    [facility], PAC  #[id number]

## 2020-01-24 NOTE — OB PROVIDER H&P - NSHPPHYSICALEXAM_GEN_ALL_CORE
Gen: wdwn female, A&Ox3, NAD,   Chest: s1s2 + RRR, no w/r/r  abd: gravid,  ext: no edema noted b/l lower extremities    5'4"  176lbs/80kg  BMI : 30.2  Finger stick this am: 66

## 2020-01-24 NOTE — OB PROVIDER H&P - NS_OBGYNHISTORY_OBGYN_ALL_OB_FT
2002 pCS ft male 2.5 kg no complications  2012 rCS f/t female 6lb4oz complicated by GDM no insulin     TOP <12 wks requiring blood transfusion    GBS positive  gyn: denies abn pap/std/hpv

## 2020-01-24 NOTE — OB NEONATOLOGY/PEDIATRICIAN DELIVERY SUMMARY - NSPEDSNEONOTESA_OBGYN_ALL_OB_FT
Called to attend repeat  of a 39 and 1/7 week infant. Mom is a 40yo , prenatal labs: B+, RPR NR, HIV -, Hep B -, GBS + (19), Rubella immune. Mom has a medical hx of sciatica of right side and PSH of previous . Pregnancy complicated by AMA, GDMA2, and GBS + (no abx prior to delivery). AROM, clear fluid at delivery. APGARs 8+8. Started blowby 40% ~5 minutes of life for central cyanosis and pre-ductal sat ~85%. Saturations improved quickly to >92%, trialed room air ~8 minutes of life, tolerated well with sats >92%. [place] voided x 2 in OR. Normal  exam.    Maternal temp 37.1C. EOS 0.08. Mom is OK with breast/bottle/hep b vaccine. Requires hypoglycemia screening protocol. Called to attend repeat  of a 39 and 1/7 week infant. Mom is a 38yo , prenatal labs: B+, RPR NR, HIV -, Hep B -, GBS + (19), Rubella immune. Mom has a medical hx of sciatica of right side and PSH of previous . Pregnancy complicated by AMA, GDMA2, and GBS + (no abx prior to delivery). AROM, clear fluid at delivery. Nuchal x 1. APGARs 8+8. Started blowby 40% ~5 minutes of life for central cyanosis and pre-ductal sat ~85%. Saturations improved quickly to >92%, trialed room air ~8 minutes of life, tolerated well with sats >92%. [place] voided x 2 in OR. Normal  exam.    Maternal temp 37.1C. EOS 0.08. Mom is OK with breast/bottle/hep b vaccine. Requires hypoglycemia screening protocol.

## 2020-01-24 NOTE — OB PROVIDER DELIVERY SUMMARY - NSPROVIDERDELIVERYNOTE_OBGYN_ALL_OB_FT
Uncomplicated repeat lower-transverse  section. Viable male infant delivered from vertex presentation. Loose nuchal x1. APGARS 8,8. Weight 3270g. Hysterotomy closed in running locked fashion. Bilateral tubal ligation performed. Peritoneum and rectus muscles reapproximated together in one layer in running fashion. Fascia reapproximated in running fashion. Subcutaneous tissue reapproximated in running fashion. Skin closed in subcuticular fashion. , , IVF 2300,  Uncomplicated repeat lower-transverse  section. Grossly normal uterus, tubes and ovaries. Viable male infant delivered from vertex presentation. Loose nuchal x1. APGARS 8,8. Weight 3270g. Hysterotomy closed in running locked fashion. Bilateral tubal ligation performed. Peritoneum and rectus muscles reapproximated together in one layer in running fashion. Fascia reapproximated in running fashion. Subcutaneous tissue reapproximated in running fashion. Skin closed in subcuticular fashion. , , IVF 2300,

## 2020-01-24 NOTE — OB PROVIDER H&P - HISTORY OF PRESENT ILLNESS
39 year old Mormonism female  at 39w1d presents for repeat scheduled  section with bilateral tubal ligation. Pt states +fm, denies any vaginal bleeding or LOF.  Pt is a known gestational diabetic on Insulin.

## 2020-01-25 LAB
BASOPHILS # BLD AUTO: 0.05 K/UL — SIGNIFICANT CHANGE UP (ref 0–0.2)
BASOPHILS NFR BLD AUTO: 0.3 % — SIGNIFICANT CHANGE UP (ref 0–2)
EOSINOPHIL # BLD AUTO: 0.07 K/UL — SIGNIFICANT CHANGE UP (ref 0–0.5)
EOSINOPHIL NFR BLD AUTO: 0.5 % — SIGNIFICANT CHANGE UP (ref 0–6)
GLUCOSE BLDC GLUCOMTR-MCNC: 108 MG/DL — HIGH (ref 70–99)
GLUCOSE BLDC GLUCOMTR-MCNC: 145 MG/DL — HIGH (ref 70–99)
GLUCOSE BLDC GLUCOMTR-MCNC: 162 MG/DL — HIGH (ref 70–99)
GLUCOSE BLDC GLUCOMTR-MCNC: 166 MG/DL — HIGH (ref 70–99)
GLUCOSE BLDC GLUCOMTR-MCNC: 175 MG/DL — HIGH (ref 70–99)
HCT VFR BLD CALC: 34.1 % — LOW (ref 34.5–45)
HGB BLD-MCNC: 10.5 G/DL — LOW (ref 11.5–15.5)
IMM GRANULOCYTES NFR BLD AUTO: 0.5 % — SIGNIFICANT CHANGE UP (ref 0–1.5)
LYMPHOCYTES # BLD AUTO: 21.4 % — SIGNIFICANT CHANGE UP (ref 13–44)
LYMPHOCYTES # BLD AUTO: 3.3 K/UL — SIGNIFICANT CHANGE UP (ref 1–3.3)
MCHC RBC-ENTMCNC: 24.8 PG — LOW (ref 27–34)
MCHC RBC-ENTMCNC: 30.8 % — LOW (ref 32–36)
MCV RBC AUTO: 80.4 FL — SIGNIFICANT CHANGE UP (ref 80–100)
MONOCYTES # BLD AUTO: 0.71 K/UL — SIGNIFICANT CHANGE UP (ref 0–0.9)
MONOCYTES NFR BLD AUTO: 4.6 % — SIGNIFICANT CHANGE UP (ref 2–14)
NEUTROPHILS # BLD AUTO: 11.2 K/UL — HIGH (ref 1.8–7.4)
NEUTROPHILS NFR BLD AUTO: 72.7 % — SIGNIFICANT CHANGE UP (ref 43–77)
NRBC # FLD: 0 K/UL — SIGNIFICANT CHANGE UP (ref 0–0)
PLATELET # BLD AUTO: 234 K/UL — SIGNIFICANT CHANGE UP (ref 150–400)
PMV BLD: 10.6 FL — SIGNIFICANT CHANGE UP (ref 7–13)
RBC # BLD: 4.24 M/UL — SIGNIFICANT CHANGE UP (ref 3.8–5.2)
RBC # FLD: 14.2 % — SIGNIFICANT CHANGE UP (ref 10.3–14.5)
WBC # BLD: 15.41 K/UL — HIGH (ref 3.8–10.5)
WBC # FLD AUTO: 15.41 K/UL — HIGH (ref 3.8–10.5)

## 2020-01-25 RX ORDER — IBUPROFEN 200 MG
600 TABLET ORAL EVERY 6 HOURS
Refills: 0 | Status: DISCONTINUED | OUTPATIENT
Start: 2020-01-25 | End: 2020-01-27

## 2020-01-25 RX ORDER — ALBUTEROL 90 UG/1
2 AEROSOL, METERED ORAL EVERY 6 HOURS
Refills: 0 | Status: DISCONTINUED | OUTPATIENT
Start: 2020-01-25 | End: 2020-01-27

## 2020-01-25 RX ADMIN — Medication 975 MILLIGRAM(S): at 23:47

## 2020-01-25 RX ADMIN — HEPARIN SODIUM 5000 UNIT(S): 5000 INJECTION INTRAVENOUS; SUBCUTANEOUS at 01:42

## 2020-01-25 RX ADMIN — HEPARIN SODIUM 5000 UNIT(S): 5000 INJECTION INTRAVENOUS; SUBCUTANEOUS at 14:42

## 2020-01-25 RX ADMIN — Medication 975 MILLIGRAM(S): at 09:05

## 2020-01-25 RX ADMIN — Medication 600 MILLIGRAM(S): at 22:32

## 2020-01-25 RX ADMIN — Medication 600 MILLIGRAM(S): at 14:43

## 2020-01-25 RX ADMIN — Medication 600 MILLIGRAM(S): at 15:29

## 2020-01-25 RX ADMIN — Medication 30 MILLIGRAM(S): at 01:00

## 2020-01-25 RX ADMIN — Medication 600 MILLIGRAM(S): at 21:32

## 2020-01-25 RX ADMIN — Medication 975 MILLIGRAM(S): at 10:00

## 2020-01-25 RX ADMIN — Medication 30 MILLIGRAM(S): at 01:33

## 2020-01-25 NOTE — PROGRESS NOTE ADULT - SUBJECTIVE AND OBJECTIVE BOX
OB Progress Note:  Delivery, POD#1    S: 38yo POD#1 s/p LTCS . Pain well controlled, tolerating regular diet, not yet passing flatus, ambulating without difficulty, voiding spontaneously. Denies heavy vaginal bleeding, N/V, CP/SOB/lightheadedness/dizziness.     O:   Vital Signs Last 24 Hrs  T(C): 36.4 (2020 05:25), Max: 36.8 (2020 15:20)  T(F): 97.5 (2020 05:25), Max: 98.3 (2020 15:20)  HR: 83 (2020 15:49) (71 - 87)  BP: 122/79 (2020 15:49) (101/49 - 130/75)  BP(mean): 89 (2020 13:00) (59 - 110)  RR: 16 (2020 05:25) (13 - 22)  SpO2: 100% (2020 05:25) (97% - 100%)    Labs:  Blood type: B Positive  Rubella IgG: RPR: Negative                          12.9   10.02 >-----------< 241    (  @ 06:40 )             41.3                  PE:  General: NAD  Abdomen: Mildly distended, appropriately tender, Fundus firm, incision c/d/i.  VE: No heavy vaginal bleeding  Extremities: No erythema, no pitting edema

## 2020-01-25 NOTE — PROGRESS NOTE ADULT - SUBJECTIVE AND OBJECTIVE BOX
Postop Day  __1_ s/p   C- Section    THERAPY:  [x  ] Spinal morphine   [  ] Epidural morphine   [  ] IV PCA Hydromorphone 1 mg/ml      Sedation Score:	  [ x ] Alert	    [  ] Drowsy        [  ] Arousable	[  ] Asleep	[  ] Unresponsive    Side Effects:	  [  x] None	     [  ] Nausea        [  ] Pruritus        [  ] Weakness   [  ] Numbness        ASSESSMENT/ PLAN   [   x] Discontinue         [  ] Continue  [ x ]Documentation and Verification of current medications     Comments:

## 2020-01-25 NOTE — PROGRESS NOTE ADULT - PROBLEM SELECTOR PLAN 1
- Continue regular diet  - Increase ambulation  - Continue motrin, tylenol, oxycodone PRN for pain control.    - f/u AM CBC    [place], PGY-1

## 2020-01-26 ENCOUNTER — TRANSCRIPTION ENCOUNTER (OUTPATIENT)
Age: 40
End: 2020-01-26

## 2020-01-26 RX ORDER — FERROUS SULFATE 325(65) MG
325 TABLET ORAL DAILY
Refills: 0 | Status: DISCONTINUED | OUTPATIENT
Start: 2020-01-26 | End: 2020-01-27

## 2020-01-26 RX ORDER — INSULIN LISPRO 100/ML
22 VIAL (ML) SUBCUTANEOUS
Qty: 0 | Refills: 0 | DISCHARGE

## 2020-01-26 RX ORDER — IBUPROFEN 200 MG
1 TABLET ORAL
Qty: 0 | Refills: 0 | DISCHARGE
Start: 2020-01-26

## 2020-01-26 RX ORDER — HUMAN INSULIN 100 [IU]/ML
30 INJECTION, SUSPENSION SUBCUTANEOUS
Qty: 0 | Refills: 0 | DISCHARGE

## 2020-01-26 RX ORDER — INSULIN LISPRO 100/ML
16 VIAL (ML) SUBCUTANEOUS
Qty: 0 | Refills: 0 | DISCHARGE

## 2020-01-26 RX ORDER — SENNA PLUS 8.6 MG/1
2 TABLET ORAL AT BEDTIME
Refills: 0 | Status: DISCONTINUED | OUTPATIENT
Start: 2020-01-26 | End: 2020-01-27

## 2020-01-26 RX ORDER — HUMAN INSULIN 100 [IU]/ML
34 INJECTION, SUSPENSION SUBCUTANEOUS
Qty: 0 | Refills: 0 | DISCHARGE

## 2020-01-26 RX ADMIN — Medication 975 MILLIGRAM(S): at 18:20

## 2020-01-26 RX ADMIN — Medication 975 MILLIGRAM(S): at 06:30

## 2020-01-26 RX ADMIN — HEPARIN SODIUM 5000 UNIT(S): 5000 INJECTION INTRAVENOUS; SUBCUTANEOUS at 02:46

## 2020-01-26 RX ADMIN — Medication 325 MILLIGRAM(S): at 09:43

## 2020-01-26 RX ADMIN — SIMETHICONE 80 MILLIGRAM(S): 80 TABLET, CHEWABLE ORAL at 15:24

## 2020-01-26 RX ADMIN — Medication 600 MILLIGRAM(S): at 23:35

## 2020-01-26 RX ADMIN — Medication 600 MILLIGRAM(S): at 16:14

## 2020-01-26 RX ADMIN — SIMETHICONE 80 MILLIGRAM(S): 80 TABLET, CHEWABLE ORAL at 09:43

## 2020-01-26 RX ADMIN — Medication 1 TABLET(S): at 09:43

## 2020-01-26 RX ADMIN — Medication 600 MILLIGRAM(S): at 02:46

## 2020-01-26 RX ADMIN — Medication 600 MILLIGRAM(S): at 10:40

## 2020-01-26 RX ADMIN — Medication 600 MILLIGRAM(S): at 03:46

## 2020-01-26 RX ADMIN — Medication 600 MILLIGRAM(S): at 15:24

## 2020-01-26 RX ADMIN — Medication 975 MILLIGRAM(S): at 17:26

## 2020-01-26 RX ADMIN — Medication 975 MILLIGRAM(S): at 07:15

## 2020-01-26 RX ADMIN — Medication 975 MILLIGRAM(S): at 13:25

## 2020-01-26 RX ADMIN — Medication 975 MILLIGRAM(S): at 12:26

## 2020-01-26 RX ADMIN — Medication 975 MILLIGRAM(S): at 00:47

## 2020-01-26 RX ADMIN — HEPARIN SODIUM 5000 UNIT(S): 5000 INJECTION INTRAVENOUS; SUBCUTANEOUS at 13:02

## 2020-01-26 RX ADMIN — Medication 600 MILLIGRAM(S): at 09:43

## 2020-01-26 NOTE — DISCHARGE NOTE OB - CARE PLAN
Principal Discharge DX:	 delivery delivered  Goal:	Routine postop care  Assessment and plan of treatment:	follow up in 2weeks/regular diet & activity as tolerate  Secondary Diagnosis:	Insulin controlled gestational diabetes mellitus (GDM) in first trimester

## 2020-01-26 NOTE — DISCHARGE NOTE OB - CARE PROVIDER_API CALL
Alysia Blackwood)  Obstetrics and Gynecology  [address] [place] [address], [address]  [place] [address]  Phone: [phone]  Fax: [phone]  Follow Up Time:

## 2020-01-26 NOTE — DISCHARGE NOTE OB - MEDICATION SUMMARY - MEDICATIONS TO STOP TAKING
I will STOP taking the medications listed below when I get home from the hospital:    calcium  -- 1 tab(s) by mouth once a day    Admelog 100 units/mL injectable solution  -- 16 unit(s) injectable once a day AM    Admelog 100 units/mL injectable solution  -- 22 unit(s) injectable once a day (at bedtime)    HumuLIN N 100 units/mL subcutaneous suspension  -- 34 unit(s) subcutaneous once a day (at bedtime)

## 2020-01-26 NOTE — DISCHARGE NOTE OB - PATIENT PORTAL LINK FT
You can access the FollowMyHealth Patient Portal offered by [facility] by registering at the following website: [email or web address]. By joining [email or web address] [email or web address] portal, you will also be able to view your health information using other applications (apps) compatible with our system.

## 2020-01-26 NOTE — PROGRESS NOTE ADULT - SUBJECTIVE AND OBJECTIVE BOX
Postpartum Note,  Section  She is a  39y woman who is now post-operative day: 2    Subjective:  The patient feels well.  She is ambulating.   She is tolerating regular diet.  She denies nausea and vomiting.  She is voiding.  Her pain is controlled.  She reports normal postpartum bleeding.    Vital Signs Last 24 Hrs  T(C): 36.6 (2020 05:47), Max: 36.9 (2020 13:56)  T(F): 97.9 (2020 05:47), Max: 98.4 (2020 13:56)  HR: 96 (2020 05:47) (96 - 104)  BP: 116/82 (2020 05:47) (116/82 - 123/71)  BP(mean): --  RR: 18 (2020 05:47) (17 - 18)  SpO2: 99% (2020 05:47) (88% - 99%)    Physical exam:  Gen: NAD  Breast: Soft, nontender, not engorged.  Abdomen: Soft, nontender, no distension , firm uterine fundus at umbilicus.  Incision: Clean, dry, and intact with steri strips  Pelvic: Normal lochia noted  Ext: No calf tenderness    LABS:                        10.5   15.41 )-----------( 234      ( 2020 06:30 )             34.1         Allergies    No Known Allergies    Intolerances      MEDICATIONS  (STANDING):  acetaminophen   Tablet .. 975 milliGRAM(s) Oral <User Schedule>  diphtheria/tetanus/pertussis (acellular) Vaccine (ADAcel) 0.5 milliLiter(s) IntraMuscular once  ferrous    sulfate 325 milliGRAM(s) Oral daily  heparin  Injectable 5000 Unit(s) SubCutaneous every 12 hours  ibuprofen  Tablet. 600 milliGRAM(s) Oral every 6 hours  prenatal multivitamin 1 Tablet(s) Oral daily    MEDICATIONS  (PRN):  ALBUTerol    90 MICROgram(s) HFA Inhaler 2 Puff(s) Inhalation every 6 hours PRN Shortness of Breath and/or Wheezing  diphenhydrAMINE 25 milliGRAM(s) Oral every 6 hours PRN Itching  glycerin Suppository - Adult 1 Suppository(s) Rectal at bedtime PRN Constipation  lanolin Ointment 1 Application(s) Topical every 6 hours PRN Sore Nipples  magnesium hydroxide Suspension 30 milliLiter(s) Oral two times a day PRN Constipation  oxyCODONE    IR 5 milliGRAM(s) Oral every 3 hours PRN Moderate to Severe Pain (4-10)  oxyCODONE    IR 5 milliGRAM(s) Oral once PRN Moderate to Severe Pain (4-10)  senna 2 Tablet(s) Oral at bedtime PRN Constipation  simethicone 80 milliGRAM(s) Chew every 4 hours PRN Gas        Assessment and Plan  POD # 2 s/p  section  Doing well.  Encourage ambulation.

## 2020-01-27 VITALS
SYSTOLIC BLOOD PRESSURE: 120 MMHG | TEMPERATURE: 98 F | DIASTOLIC BLOOD PRESSURE: 75 MMHG | HEART RATE: 93 BPM | OXYGEN SATURATION: 99 % | RESPIRATION RATE: 16 BRPM

## 2020-01-27 RX ADMIN — Medication 975 MILLIGRAM(S): at 05:07

## 2020-01-27 RX ADMIN — Medication 600 MILLIGRAM(S): at 00:05

## 2020-01-27 RX ADMIN — Medication 975 MILLIGRAM(S): at 04:07

## 2020-01-27 RX ADMIN — Medication 600 MILLIGRAM(S): at 06:48

## 2020-01-27 RX ADMIN — Medication 600 MILLIGRAM(S): at 06:16

## 2020-01-27 RX ADMIN — Medication 975 MILLIGRAM(S): at 10:00

## 2020-01-27 RX ADMIN — Medication 975 MILLIGRAM(S): at 10:45

## 2020-01-27 RX ADMIN — HEPARIN SODIUM 5000 UNIT(S): 5000 INJECTION INTRAVENOUS; SUBCUTANEOUS at 01:05

## 2020-01-27 NOTE — PROGRESS NOTE ADULT - SUBJECTIVE AND OBJECTIVE BOX
SUBJECTIVE:    Pain: Controlled    Complaints:  C/O Back Discomfort    MILESTONES:    Alert and Oriented x 3  [ x ]  Out of bed/ ambulating. [ x ]  Flatus:   Positive [ x ]  Negative [  ]  Bowel movement  [  ] Positive [  ] Negative   Voiding [x  ] Due to void [  ]   Moses/Indwelling catheter in place [  ]  Diet: Regular [ x ]  Clears [  ]  NPO [  ]    Infant feeding:  Breast [  ]   Bottle [  ]  Both [  ]  Feeding related issues and/or concerns:      OBJECTIVE:  T(C): 36.7 (20 @ 05:57), Max: 36.9 (20 @ 21:28)  HR: 93 (20 @ 05:57) (93 - 106)  BP: 120/75 (20 @ 05:57) (120/75 - 135/86)  RR: 16 (20 @ 05:57) (16 - 18)  SpO2: 99% (20 @ 05:57) (99% - 99%)  Wt(kg): --          Blood Type: B Positive    RPR: Negative          MEDICATIONS  (STANDING):  acetaminophen   Tablet .. 975 milliGRAM(s) Oral <User Schedule>  diphtheria/tetanus/pertussis (acellular) Vaccine (ADAcel) 0.5 milliLiter(s) IntraMuscular once  ferrous    sulfate 325 milliGRAM(s) Oral daily  heparin  Injectable 5000 Unit(s) SubCutaneous every 12 hours  ibuprofen  Tablet. 600 milliGRAM(s) Oral every 6 hours  prenatal multivitamin 1 Tablet(s) Oral daily    MEDICATIONS  (PRN):  ALBUTerol    90 MICROgram(s) HFA Inhaler 2 Puff(s) Inhalation every 6 hours PRN Shortness of Breath and/or Wheezing  diphenhydrAMINE 25 milliGRAM(s) Oral every 6 hours PRN Itching  glycerin Suppository - Adult 1 Suppository(s) Rectal at bedtime PRN Constipation  lanolin Ointment 1 Application(s) Topical every 6 hours PRN Sore Nipples  magnesium hydroxide Suspension 30 milliLiter(s) Oral two times a day PRN Constipation  oxyCODONE    IR 5 milliGRAM(s) Oral every 3 hours PRN Moderate to Severe Pain (4-10)  oxyCODONE    IR 5 milliGRAM(s) Oral once PRN Moderate to Severe Pain (4-10)  senna 2 Tablet(s) Oral at bedtime PRN Constipation  simethicone 80 milliGRAM(s) Chew every 4 hours PRN Gas        ASSESSMENT:    39y     G 4     P   3013      PO Day#  3        Delivery: Primary [  ]    Repeat [ X ]       EBL - 600                                  Indication of procedure: Scheduled, with BTL    Condition: Stable    Past Medical History significant for: HPI:  39 year old Bahai female  at 39w1d presents for repeat scheduled  section with bilateral tubal ligation. Pt states +fm, denies any vaginal bleeding or LOF.  Pt is a known gestational diabetic on Insulin. (2020 07:14)      Current Issues:    Breasts:  Soft [x  ]   Engorged [  ]  Nipples:  Abdomen: Soft [ x ]   Distended [  ] Nontender [  ]     Bowel sounds :  Present [  ]  Absent [  ]   Fundus:  Firm [x  ]  Boggy [  ]    Abdominal incision: Clean, Dry and Intact [x  ]  Staples [  ] Steri Strips [ X ] Dermabond [  ] Sutures [  ]    Patient wearing abdominal binder for support.    Vaginal: Lochia:  Heavy [  ]  Moderate [ x ]   Scant [  ]    Extremities: Edema [  ] Negative Ingrid's Sign [  ] Nontender Isidro  [ x ] Positive pedal pulses [  ]    Other relevant physical exam findings: Warm Compress to Back for discomfort. No swelling or redness noted.      PLAN:    Plan: Increase ambulation, analgesia PRN and pain medication protocol standing oxycodone, ibuprofen and acetaminophen.    Diet: Regular diet    Continue routine post-operative and postpartum care.     Diabetes - For Repeat Glucose Testing in 6 Weeks.    Discharge Planning [ x ]    For discharge Today  [  X  ]    Consults:  Social Work [  ]  Lactation [ x ]  Other [         ]

## 2020-01-28 LAB — SURGICAL PATHOLOGY STUDY: SIGNIFICANT CHANGE UP

## 2020-04-24 NOTE — OB RN INTRAOPERATIVE NOTE - NS_WOUNDCLASS_OBGYN_ALL_OB
OUTPATIENT PROGRESS NOTE    REASON FOR VISIT:  MED CHECK  TOTAL TIME SPENT:  11:44-12:03    S:  She comes for follow-up on:    1)  Major depressive disorder recurrent moderate-doing ok, adjusting to routine at home.  Energy fluctuates. Denies suicidal ideations. Some breakdowns, missing mother. Appetite is ok. sleeping 8 hours.  No side effects. She has not noticed difference with modafinil. Vitamin d was last checked 2016 and 10. Will reorder. Since routine lab work not done at this time, will order prescription dose for her to try. She does not get out much to get sun   2)  Panic disorder without agoraphobia-no panic attacks.  No side effects.     3) Generalized anxiety disorder-uses clonazepam usually just 1mg in am. .  No side effects.       ROS: denies shortness of breath and chest pain  Medication list has been reviewed. PDMP reviewed  Substance hx: smoking 1/2ppd. Alcohol once in last month. Denies  Illicit drug use  Interval family medical hx: denies changes  Interval medical hx: denies changes  Interval soc hx: working 4 hours per week. Finished job end of February. Living with son and boyfriend, he is able to work      O:  Appearance: tall, well-nourished         Grooming: intact        Psychomotor: no abnormalities noted        Speech:  Normal rate, rhythm, quantity        Mood:  \"ok\"        Affect: euthymic        Thought process:  Goal-directed        Associations: intact        Thought Content:  NO suicidal nor homicidal ideations        Perception:  NO auditory or visual hallucinations        Insight:  Fair        Judgement:  Fair        Attention: fair        Concentration: fair  ASSESSMENT/DIAGNOSIS:   1.  Major depression recurrent moderate-chronic, not optimal, needs med changes   2. Panic disorder without agoraphobia-chronic, improved, no med changes   3. Generalized anxiety disorder-chronic, improved, no med changes     PLAN:     1. Continue Citalopram 40mg qam   2. Continue clonazepam 0.5mg qid  prn   3. Return to clinic in 3 months   4. Continue bupropion xl 300mg qam   5. Start vitamin d 50,000 units once a week for 12 weeks. Check level        Citlaly Jaime MD   2

## 2021-04-19 NOTE — OB RN PATIENT PROFILE - BLOOD TRANSFUSION, PREVIOUS, PROFILE
[address] [facility] time__4/23/21 0800__________        Surgery time__0900__________    Take the following medications with a sip of water:    Do not eat or drink anything after 12:00 midnight prior to your surgery. This includes water chewing gum, mints and ice chips. You may brush your teeth and gargle the morning of your surgery, but do not swallow the water     Please see your family doctor/pediatrician for a history and physical and/or concerning medications. Bring any test results/reports from your physicians office. If you are under the care of a heart doctor or specialist doctor, please be aware that you may be asked to them for clearance    You may be asked to stop blood thinners such as Coumadin, Plavix, Fragmin, Lovenox, etc., or any anti-inflammatories such as:  Aspirin, Ibuprofen, Advil, Naproxen prior to your surgery. We also ask that you stop any OTC medications such as fish oil, vitamin E, glucosamine, garlic, Multivitamins, COQ 10, etc.    We ask that you do not smoke 24 hours prior to surgery  We ask that you do not  drink any alcoholic beverages 24 hours prior to surgery     You must make arrangements for a responsible adult to take you home after your surgery. For your safety you will not be allowed to leave alone or drive yourself home. Your surgery will be cancelled if you do not have a ride home. Also for your safety, it is strongly suggested that someone stay with you the first 24 hours after your surgery. A parent or legal guardian must accompany a child scheduled for surgery and plan to stay at the hospital until the child is discharged. Please do not bring other children with you. For your comfort, please wear simple loose fitting clothing to the hospital.  Please do not bring valuables.     Do not wear any make-up or nail polish on your fingers or toes      For your safety, please do not wear any jewelry or body piercing's on the day of surgery. All jewelry must be removed. If you have dentures, they will be removed before going to operating room. For your convenience, we will provide you with a container. If you wear contact lenses or glasses, they will be removed, please bring a case for them. If you have a living will and a durable power of  for healthcare, please bring in a copy. As part of our patient safety program to minimize surgical site infections, we ask you to do the following:    · Please notify your surgeon if you develop any illness between         now and the  day of your surgery. · This includes a cough, cold, fever, sore throat, nausea,         or vomiting, and diarrhea, etc.  ·  Please notify your surgeon if you experience dizziness, shortness         of breath or blurred vision between now and the time of your surgery. Do not shave your operative site 96 hours prior to surgery. For face and neck surgery, men may use an electric razor 48 hours   prior to surgery. You may shower the night before surgery or the morning of   your surgery with an antibacterial soap. You will need to bring a photo ID and insurance card    [facility] has an onsite pharmacy, would you like to utilize our pharmacy     If you will be staying overnight and use a C-pap machine, please bring   your C-pap to hospital     Our goal is to provide you with excellent care, therefore, visitors will be limited to two(2) in the room at a time so that we may focus on providing this care for you. Please contact pre-admission testing if you have any further questions. [facility] phone number:  [phone]  Please note these are generalized instructions for all surgical cases, you may be provided with more specific instructions according to your surgery. yes/2005 after miscarriage

## 2022-11-02 NOTE — OB PROVIDER DELIVERY SUMMARY - NS_DELIVERYATTENDING1_OBGYN_ALL_OB_FT
Derek requested a refill of Levothyroxine 112 mcg Qty 90 /1   Patient has a follow up appointment on 6/6/2022 .      Alysia Blackwood

## 2024-10-11 ENCOUNTER — EMERGENCY (EMERGENCY)
Facility: HOSPITAL | Age: 44
LOS: 1 days | Discharge: HOME | End: 2024-10-11
Attending: STUDENT IN AN ORGANIZED HEALTH CARE EDUCATION/TRAINING PROGRAM | Admitting: EMERGENCY MEDICINE
Payer: MEDICAID

## 2024-10-11 VITALS
SYSTOLIC BLOOD PRESSURE: 125 MMHG | HEIGHT: 64 IN | OXYGEN SATURATION: 99 % | DIASTOLIC BLOOD PRESSURE: 78 MMHG | WEIGHT: 151.9 LBS | HEART RATE: 85 BPM | TEMPERATURE: 98 F | RESPIRATION RATE: 18 BRPM

## 2024-10-11 DIAGNOSIS — Z98.891 HISTORY OF UTERINE SCAR FROM PREVIOUS SURGERY: Chronic | ICD-10-CM

## 2024-10-11 NOTE — ED ADULT TRIAGE NOTE - CHIEF COMPLAINT QUOTE
Pt Corey speaking. As per Son Jayesh " For over a week she has pain in neck and rt shoulder pain . Pain in neck ,  rt shoulder arm  and headache. She saw MD gave her medication not helping the pain....feel weakness in rt hand. Sent by MD for MRI . " Denies trauma. hx of DM2, high cholesterol

## 2024-10-12 PROBLEM — O24.414 GESTATIONAL DIABETES MELLITUS IN PREGNANCY, INSULIN CONTROLLED: Chronic | Status: ACTIVE | Noted: 2020-01-21

## 2024-10-12 PROBLEM — M54.31 SCIATICA, RIGHT SIDE: Chronic | Status: ACTIVE | Noted: 2020-01-21

## 2024-10-12 LAB
ADD ON TEST-SPECIMEN IN LAB: SIGNIFICANT CHANGE UP
ALBUMIN SERPL ELPH-MCNC: 4.2 G/DL — SIGNIFICANT CHANGE UP (ref 3.3–5)
ALP SERPL-CCNC: 81 U/L — SIGNIFICANT CHANGE UP (ref 40–120)
ALT FLD-CCNC: 14 U/L — SIGNIFICANT CHANGE UP (ref 4–33)
ANION GAP SERPL CALC-SCNC: 14 MMOL/L — SIGNIFICANT CHANGE UP (ref 7–14)
AST SERPL-CCNC: 11 U/L — SIGNIFICANT CHANGE UP (ref 4–32)
BASOPHILS # BLD AUTO: 0.05 K/UL — SIGNIFICANT CHANGE UP (ref 0–0.2)
BASOPHILS NFR BLD AUTO: 0.5 % — SIGNIFICANT CHANGE UP (ref 0–2)
BILIRUB SERPL-MCNC: 0.3 MG/DL — SIGNIFICANT CHANGE UP (ref 0.2–1.2)
BUN SERPL-MCNC: 10 MG/DL — SIGNIFICANT CHANGE UP (ref 7–23)
CALCIUM SERPL-MCNC: 8.8 MG/DL — SIGNIFICANT CHANGE UP (ref 8.4–10.5)
CHLORIDE SERPL-SCNC: 102 MMOL/L — SIGNIFICANT CHANGE UP (ref 98–107)
CO2 SERPL-SCNC: 20 MMOL/L — LOW (ref 22–31)
CREAT SERPL-MCNC: 0.45 MG/DL — LOW (ref 0.5–1.3)
EGFR: 123 ML/MIN/1.73M2 — SIGNIFICANT CHANGE UP
EOSINOPHIL # BLD AUTO: 0.01 K/UL — SIGNIFICANT CHANGE UP (ref 0–0.5)
EOSINOPHIL NFR BLD AUTO: 0.1 % — SIGNIFICANT CHANGE UP (ref 0–6)
GLUCOSE SERPL-MCNC: 258 MG/DL — HIGH (ref 70–99)
HCT VFR BLD CALC: 33.9 % — LOW (ref 34.5–45)
HGB BLD-MCNC: 10.4 G/DL — LOW (ref 11.5–15.5)
IANC: 8.13 K/UL — HIGH (ref 1.8–7.4)
IMM GRANULOCYTES NFR BLD AUTO: 0.6 % — SIGNIFICANT CHANGE UP (ref 0–0.9)
LYMPHOCYTES # BLD AUTO: 1.34 K/UL — SIGNIFICANT CHANGE UP (ref 1–3.3)
LYMPHOCYTES # BLD AUTO: 13.6 % — SIGNIFICANT CHANGE UP (ref 13–44)
MCHC RBC-ENTMCNC: 23.6 PG — LOW (ref 27–34)
MCHC RBC-ENTMCNC: 30.7 GM/DL — LOW (ref 32–36)
MCV RBC AUTO: 76.9 FL — LOW (ref 80–100)
MONOCYTES # BLD AUTO: 0.24 K/UL — SIGNIFICANT CHANGE UP (ref 0–0.9)
MONOCYTES NFR BLD AUTO: 2.4 % — SIGNIFICANT CHANGE UP (ref 2–14)
NEUTROPHILS # BLD AUTO: 8.13 K/UL — HIGH (ref 1.8–7.4)
NEUTROPHILS NFR BLD AUTO: 82.8 % — HIGH (ref 43–77)
NRBC # BLD: 0 /100 WBCS — SIGNIFICANT CHANGE UP (ref 0–0)
NRBC # FLD: 0 K/UL — SIGNIFICANT CHANGE UP (ref 0–0)
PLATELET # BLD AUTO: 286 K/UL — SIGNIFICANT CHANGE UP (ref 150–400)
POTASSIUM SERPL-MCNC: 4 MMOL/L — SIGNIFICANT CHANGE UP (ref 3.5–5.3)
POTASSIUM SERPL-SCNC: 4 MMOL/L — SIGNIFICANT CHANGE UP (ref 3.5–5.3)
PROT SERPL-MCNC: 7.9 G/DL — SIGNIFICANT CHANGE UP (ref 6–8.3)
RBC # BLD: 4.41 M/UL — SIGNIFICANT CHANGE UP (ref 3.8–5.2)
RBC # FLD: 17.3 % — HIGH (ref 10.3–14.5)
SODIUM SERPL-SCNC: 136 MMOL/L — SIGNIFICANT CHANGE UP (ref 135–145)
TROPONIN T, HIGH SENSITIVITY RESULT: <6 NG/L — SIGNIFICANT CHANGE UP
WBC # BLD: 9.83 K/UL — SIGNIFICANT CHANGE UP (ref 3.8–10.5)
WBC # FLD AUTO: 9.83 K/UL — SIGNIFICANT CHANGE UP (ref 3.8–10.5)

## 2024-10-12 PROCEDURE — 70553 MRI BRAIN STEM W/O & W/DYE: CPT | Mod: 26,MC

## 2024-10-12 PROCEDURE — 99223 1ST HOSP IP/OBS HIGH 75: CPT

## 2024-10-12 PROCEDURE — 72156 MRI NECK SPINE W/O & W/DYE: CPT | Mod: 26,MC

## 2024-10-12 RX ORDER — TRIAMCINOLONE ACETONIDE 1 MG/G
1 CREAM TOPICAL
Refills: 0 | Status: DISCONTINUED | OUTPATIENT
Start: 2024-10-12 | End: 2024-10-15

## 2024-10-12 RX ORDER — KETOROLAC TROMETHAMINE 10 MG/1
30 TABLET, FILM COATED ORAL ONCE
Refills: 0 | Status: DISCONTINUED | OUTPATIENT
Start: 2024-10-12 | End: 2024-10-12

## 2024-10-12 RX ADMIN — KETOROLAC TROMETHAMINE 30 MILLIGRAM(S): 10 TABLET, FILM COATED ORAL at 21:16

## 2024-10-12 RX ADMIN — KETOROLAC TROMETHAMINE 30 MILLIGRAM(S): 10 TABLET, FILM COATED ORAL at 13:23

## 2024-10-12 RX ADMIN — TRIAMCINOLONE ACETONIDE 1 APPLICATION(S): 1 CREAM TOPICAL at 19:39

## 2024-10-12 RX ADMIN — KETOROLAC TROMETHAMINE 30 MILLIGRAM(S): 10 TABLET, FILM COATED ORAL at 21:35

## 2024-10-12 RX ADMIN — KETOROLAC TROMETHAMINE 30 MILLIGRAM(S): 10 TABLET, FILM COATED ORAL at 12:18

## 2024-10-12 NOTE — ED PROVIDER NOTE - OBJECTIVE STATEMENT
44-year-old female presents the ER with proximately 10 days of pain to the right neck rating down the right shoulder and right arm.  It is associated with weakness of the right hand.  She is right-hand dominant.  She says that culturally she always eats with her right hand and she is having difficulty eating because she cannot use her right hand.  She went to her primary care doctor who prescribed her methocarbamol and ibuprofen which did not help her pain.  She then revisited the same doctor methocarbamol and ibuprofen which did not help her pain.  She then revisited the same doctor who prescribed her Valium and dexamethasone.  She is currently taking his medications now but says that her hand is getting weaker.  She saw physiatrist who recommended ED evaluation for her right hand weakness.  Patient is a diabetic.

## 2024-10-12 NOTE — ED CDU PROVIDER INITIAL DAY NOTE - PROGRESS NOTE DETAILS
Pt was being readied to be moved from Intake to CDU when Intake staff noted bedbug underneath where pt had been lying on stretcher.  Isolation protocol followed accordingly after ED nursing management notified.

## 2024-10-12 NOTE — ED PROVIDER NOTE - CLINICAL SUMMARY MEDICAL DECISION MAKING FREE TEXT BOX
Adult female here with neck pain and right shoulder pain rating down the right arm.  Pending MRI of the brain and cervical spine due to right hand weakness on exam.  Differential diagnosis includes cervical radiculopathy.  Must rule out intracranial lesion as well as spinal cord lesion.  Vitals are stable.  Symptoms are going on for several days.  Will place in CDU for MRI.  Will reassess   The need for specialty consultation after the MRI results.

## 2024-10-12 NOTE — ED CDU PROVIDER INITIAL DAY NOTE - ENMT, MLM
55M with PMHx of HTN and recent + covid dx about 9 days ago p/w desaturation. patient has a home pulse ox and reading was in the 80s. EMS called and sat in the 90s. here in ED again in the 90s. patient denies any worsening sob. + dry cough.     ***GEN - NAD; well appearing; A+O x3 ***HEAD - NC/AT ***EYES/NOSE - PERRL, EOMI, mucous membranes moist, no discharge ***THROAT: Oral cavity and pharynx normal. No inflammation, swelling, exudate, or lesions.  ***NECK: Neck supple, non-tender without lymphadenopathy, no masses, no thyromegaly.   ***PULMONARY - CTA b/l, symmetric breath sounds. ***CARDIAC -s1s2, RRR, no M,G,R  ***ABDOMEN - +BS, ND, NT, soft, no guarding, no rebound, no masses   ***BACK - no CVA tenderness, Normal  spine ***EXTREMITIES - symmetric pulses, 2+ dp, capillary refill < 2 seconds, no clubbing, no cyanosis, no edema ***SKIN - no rash or bruising   ***NEUROLOGIC - alert, CN 2-12 intact    MDM: 55M with covid and probable erroneous reading on home pulse oximeter. well appearing. will obtain labs, cxr. 55M with PMHx of HTN and recent + covid dx about 9 days ago p/w desaturation. patient has a home pulse ox and reading was in the 80s. EMS called and sat in the 90s. here in ED again in the 90s. patient denies any worsening sob. + dry cough.     ***GEN - NAD; well appearing; A+O x3 ***HEAD - NC/AT ***EYES/NOSE - PERRL, EOMI, mucous membranes moist, no discharge ***THROAT: Oral cavity and pharynx normal. No inflammation, swelling, exudate, or lesions.  ***NECK: Neck supple, non-tender without lymphadenopathy, no masses, no thyromegaly.   ***PULMONARY - CTA b/l, symmetric breath sounds. ***CARDIAC -s1s2, RRR, no M,G,R  ***ABDOMEN - +BS, ND, NT, soft, no guarding, no rebound, no masses   ***BACK - no CVA tenderness, Normal  spine ***EXTREMITIES - symmetric pulses, 2+ dp, capillary refill < 2 seconds, no clubbing, no cyanosis, no edema ***SKIN - no rash or bruising   ***NEUROLOGIC - alert, CN 2-12 intact    MDM: 55M with covid and possible erroneous reading on home pulse oximeter. well appearing. will obtain labs, cxr. Airway patent. Nasal mucosa clear. Mouth with normal mucosa. Throat has no vesicles, no oropharyngeal exudates and uvula is midline.

## 2024-10-12 NOTE — ED PROVIDER NOTE - PHYSICAL EXAMINATION
Right hand: patient is having difficulty making a thumbs up sign.  She cannot fully flex digits 2 through 5.  Patient is also having difficulty making an A-OK sign in the sense that she cannot bring her thumb and pointer finger together.  She can also have is unable to oppose the thumb to the pinky.  She has a strong radial pulse she has no sensory deficits on exam to the right hand.  She is able to range the right shoulder but cannot lift it above 90 degrees and abduction.  No obvious deformity or dislocation.  No crepitus.  Soft compartments.  She has right neck tenderness to palpation paraspinal area.

## 2024-10-12 NOTE — ED CDU PROVIDER INITIAL DAY NOTE - NSICDXPASTMEDICALHX_GEN_ALL_CORE_FT
PAST MEDICAL HISTORY:  Insulin controlled gestational diabetes mellitus (GDM) in first trimester     Sciatica of right side

## 2024-10-12 NOTE — ED ADULT NURSE REASSESSMENT NOTE - NS ED NURSE REASSESS COMMENT FT1
NAD. pt denies SOB, chest pain, dizziness, weakness, urinary symptoms, HA, n/v/d, fevers, chills, pain. respirations are even and un labored. safety precautions maintained. awaiting MRI to be done.
pt remains at baseline mental status A&OX3, RR even unlabored completing full clear sentences. pt resting in stretcher comfortably at this time, no new complaints offered. well appearing on assessment. rpt vs per flowsheet. stretcher lowest position siderails up safety measures in place. awaiting MRI at this time
pt returned from [facility]. pt education re-inforced to not touch personal clothes that were placed in belonging bags, pt verbalized understanding. pt moved to private room int15.
report given CDU KAHLIL Maria, nomi performed and pt transported to CDU with all pt belongings
report received from, overnight RN. A&Ox4. NAD. pt denies SOB, chest pain, dizziness, weakness, urinary symptoms, HA, n/v/d, fevers, chills, pain. respirations are even and un labored. IV placed prior to RN shift, no adverse affects observed. safety precautions maintained. call bell at bedside. awaiting urology consult
upon reassessment of pt small bed bug noted on blanket of pt. bug collected in specimen container and ANM Love made aware. pt to not be moved to CDU. MICH Aranda made aware of situation and educated patient on need for DECON shower, pending medicated soap from pharmacy for patient.

## 2024-10-12 NOTE — ED ADULT NURSE NOTE - OBJECTIVE STATEMENT
44 year old AO4 ambulatory female c/o headache and neck pain that radiates down to her left hand. PMH of DM and HLD. States last week she woke to feeling numbness in her hands and headache and neck pain that was interfering with her ADLs. States she saw her PCP who prescribed her some medicine but she feels no relief prompting ED visit. Left 20g IV placed, labs drawn, medicated as per eMAR. Pending MRI. Pt safety maintained.

## 2024-10-12 NOTE — ED CDU PROVIDER INITIAL DAY NOTE - NSICDXFAMILYHX_GEN_ALL_CORE_FT
FAMILY HISTORY:  Father  Still living? Unknown  Family history of diabetes mellitus, Age at diagnosis: Age Unknown  Family history of heart disease, Age at diagnosis: Age Unknown

## 2024-10-13 VITALS
OXYGEN SATURATION: 100 % | DIASTOLIC BLOOD PRESSURE: 82 MMHG | TEMPERATURE: 98 F | SYSTOLIC BLOOD PRESSURE: 126 MMHG | HEART RATE: 82 BPM | RESPIRATION RATE: 17 BRPM

## 2024-10-13 PROCEDURE — 99239 HOSP IP/OBS DSCHRG MGMT >30: CPT

## 2024-10-13 RX ORDER — KETOROLAC TROMETHAMINE 10 MG/1
30 TABLET, FILM COATED ORAL EVERY 6 HOURS
Refills: 0 | Status: DISCONTINUED | OUTPATIENT
Start: 2024-10-13 | End: 2024-10-13

## 2024-10-13 RX ORDER — LIDOCAINE 50 MG/G
1 CREAM TOPICAL
Qty: 3 | Refills: 0
Start: 2024-10-13 | End: 2024-10-27

## 2024-10-13 RX ADMIN — TRIAMCINOLONE ACETONIDE 1 APPLICATION(S): 1 CREAM TOPICAL at 05:56

## 2024-10-13 RX ADMIN — KETOROLAC TROMETHAMINE 30 MILLIGRAM(S): 10 TABLET, FILM COATED ORAL at 06:42

## 2024-10-13 RX ADMIN — KETOROLAC TROMETHAMINE 30 MILLIGRAM(S): 10 TABLET, FILM COATED ORAL at 07:05

## 2024-10-13 NOTE — ED CDU PROVIDER SUBSEQUENT DAY NOTE - HISTORY
44-year-old female presents the ER with proximately 10 days of pain to the right neck rating down the right shoulder and right arm.  It is associated with weakness of the right hand.  She is right-hand dominant.  She says that culturally she always eats with her right hand and she is having difficulty eating because she cannot use her right hand.  She went to her primary care doctor who prescribed her methocarbamol and ibuprofen which did not help her pain.  She then revisited the same doctor methocarbamol and ibuprofen which did not help her pain.  She then revisited the same doctor who prescribed her Valium and dexamethasone.  She is currently taking his medications now but says that her hand is getting weaker.  She saw physiatrist who recommended ED evaluation for her right hand weakness.  Patient is a diabetic.  Pt was evaluated in the ED and sent to CDU for MRI imaging for further eval.  Of note, while in Intake area of ED, staff noted bedbug where pt was lying on stretcher; decontamination protocol followed; pt and family (pt's son) made aware; son states they live in an apt. building that has had a recurrent issue with bedbug infestation throughout the apartment building; stated will obtain  (again).  In the interim, pt objectively noted to be resting comfortably; pt has been clinically stable; no issues thus far.  MRIs were performed with no emergent findings noted per review of radiology report.  Pt with reported relief with Toradol.

## 2024-10-13 NOTE — ED CDU PROVIDER DISPOSITION NOTE - NSFOLLOWUPINSTRUCTIONS_ED_ALL_ED_FT
Follow up with an Orthopedist within the week- Our discharge center will call you to assist with the appointment within the week or you can call them at [phone] extension [phone] or [phone]. You can also call  Find a Physician helpline ([phone]) for assistance   Take all of your medications as previously prescribed.    Apply warm compresses to your neck and upper back 2-3 times/day.    Light movements, no heavy lifting.    Take Motrin 600mg every 6-8hrs as needed for pain with food.   Apply a Lidocaine patch to your neck once daily remove after 12 hours    Worsening or new weakness, pain, numbness, fever, chills or new concerning symptoms return to the Emergency Department

## 2024-10-13 NOTE — ED CDU PROVIDER SUBSEQUENT DAY NOTE - PHYSICAL EXAMINATION
CONSTITUTIONAL:  Well appearing, awake, alert, oriented to person, place, time/situation and in no apparent distress.  Pt. is objectively comfortable appearing and verbalizing in full, clear, effortless sentences.  ENMT: NC/AT.  Airway patent.  Nasal mucosa clear.  Moist mucous membranes.  Neck supple.  EYES:  Clear OU.  CARDIAC:  Normal rate, regular rhythm.  Heart sounds S1 S2.  No murmurs, gallops, or rubs.  RESPIRATORY:  Breath sounds clear and equal bilaterally.  No wheezes, no rales, no rhonchi.  GASTROINTESTINAL:  Abdomen soft, non-distended, non-tender.  No rebound, no guarding.  NEUROLOGICAL:  Alert and oriented to person/place/time/situation.  No focal deficits; no tremors noted.   MUSCULOSKELETAL:  Range of motion is not limited.     SKIN:  Skin color unremarkable.  Skin warm, dry.   PSYCHIATRIC:  Alert and oriented to person/place/time/situation.  Mood and affect WNL.  No apparent risk to self or others.

## 2024-10-13 NOTE — ED CDU PROVIDER DISPOSITION NOTE - CLINICAL COURSE
44-year-old female presents the ER with proximately 10 days of pain to the right neck rating down the right shoulder and right arm.  It is associated with weakness of the right hand.  She is right-hand dominant.  She says that culturally she always eats with her right hand and she is having difficulty eating because she cannot use her right hand.  She went to her primary care doctor who prescribed her methocarbamol and ibuprofen which did not help her pain.  She then revisited the same doctor methocarbamol and ibuprofen which did not help her pain.  She then revisited the same doctor who prescribed her Valium and dexamethasone.  She is currently taking his medications now but says that her hand is getting weaker.  She saw physiatrist who recommended ED evaluation for her right hand weakness.  Patient is a diabetic.  Pt was evaluated in the ED and sent to CDU for MRI imaging for further eval.  Of note, while in Intake area of ED, staff noted bedbug where pt was lying on stretcher; decontamination protocol followed; pt and family (pt's son) made aware; son states they live in an apt. building that has had a recurrent issue with bedbug infestation throughout the apartment building; stated will obtain  (again).  In the interim, pt objectively noted to be resting comfortably; pt has been clinically stable; no issues thus far.  MRIs were performed: Mild to moderate C5-6 central canal and right foraminal stenoses.  C7-T1 herniated disc, without significant encroachment. Pt with reported relief with Toradol. Will DC home with Ortho spine follow up. MICH Aranda discussed results with son as well. Strict ED return precautions discussed. Patient understands and agrees.

## 2024-10-13 NOTE — ED CDU PROVIDER DISPOSITION NOTE - PATIENT PORTAL LINK FT
You can access the FollowMyHealth Patient Portal offered by [facility] by registering at the following website: [email or web address]. By joining [employer]’s FollowMyHealth portal, you will also be able to view your health information using other applications (apps) compatible with our system.

## 2024-10-17 NOTE — DISCHARGE NOTE OB - MATERIALS PROVIDED
[0] : 2) Feeling down, depressed, or hopeless: Not at all (0) [PHQ-2 Negative] : PHQ-2 Negative [Pain Scale: ___] : On a scale of 1-10, today the patient's pain is a(n) [unfilled]. [Never] : Never [Patient/Caregiver not ready to engage] : Patient/Caregiver not ready to engage [FreeTextEntry7] : na Vaccinations/[facility] Hearing Screen Program/  Immunization Record/Breastfeeding Log/Breastfeeding Guide and Packet/Back To Sleep Handout/Shaken Baby Prevention Handout/Breastfeeding Mother’s Support Group Information/Guide to Postpartum Care/Birth Certificate Instructions/[facility] [place] Screening Program

## 2024-10-19 ENCOUNTER — EMERGENCY (EMERGENCY)
Facility: HOSPITAL | Age: 44
LOS: 1 days | Discharge: HOME | End: 2024-10-19
Attending: STUDENT IN AN ORGANIZED HEALTH CARE EDUCATION/TRAINING PROGRAM | Admitting: EMERGENCY MEDICINE
Payer: MEDICAID

## 2024-10-19 VITALS
WEIGHT: 151.9 LBS | RESPIRATION RATE: 17 BRPM | HEIGHT: 64 IN | OXYGEN SATURATION: 98 % | SYSTOLIC BLOOD PRESSURE: 145 MMHG | DIASTOLIC BLOOD PRESSURE: 72 MMHG | TEMPERATURE: 99 F | HEART RATE: 78 BPM

## 2024-10-19 DIAGNOSIS — Z98.891 HISTORY OF UTERINE SCAR FROM PREVIOUS SURGERY: Chronic | ICD-10-CM

## 2024-10-19 PROCEDURE — 93010 ELECTROCARDIOGRAM REPORT: CPT

## 2024-10-19 PROCEDURE — 99285 EMERGENCY DEPT VISIT HI MDM: CPT | Mod: 25

## 2024-10-19 NOTE — ED ADULT TRIAGE NOTE - CHIEF COMPLAINT QUOTE
pt was seen on 10/13 was told has herniated disc. reports increasing pain to back, b/l shoulders and neck area. reports right arm numbness beginning around 10am. states has a headache x 2 weeks. no tingling to extremities. no chest pain, sob, dizziness, blurry vision, slurred speech. pt noted to have numbness/ weakness to the right arm, code stroke called. hx. DM, HLD

## 2024-10-20 VITALS
OXYGEN SATURATION: 98 % | SYSTOLIC BLOOD PRESSURE: 116 MMHG | RESPIRATION RATE: 18 BRPM | HEART RATE: 85 BPM | TEMPERATURE: 98 F | DIASTOLIC BLOOD PRESSURE: 77 MMHG

## 2024-10-20 LAB
ALBUMIN SERPL ELPH-MCNC: 4 G/DL — SIGNIFICANT CHANGE UP (ref 3.3–5)
ALP SERPL-CCNC: 91 U/L — SIGNIFICANT CHANGE UP (ref 40–120)
ALT FLD-CCNC: 15 U/L — SIGNIFICANT CHANGE UP (ref 4–33)
ANION GAP SERPL CALC-SCNC: 12 MMOL/L — SIGNIFICANT CHANGE UP (ref 7–14)
AST SERPL-CCNC: 12 U/L — SIGNIFICANT CHANGE UP (ref 4–32)
BASOPHILS # BLD AUTO: 0.05 K/UL — SIGNIFICANT CHANGE UP (ref 0–0.2)
BASOPHILS NFR BLD AUTO: 0.5 % — SIGNIFICANT CHANGE UP (ref 0–2)
BILIRUB SERPL-MCNC: 0.3 MG/DL — SIGNIFICANT CHANGE UP (ref 0.2–1.2)
BUN SERPL-MCNC: 10 MG/DL — SIGNIFICANT CHANGE UP (ref 7–23)
CALCIUM SERPL-MCNC: 9 MG/DL — SIGNIFICANT CHANGE UP (ref 8.4–10.5)
CHLORIDE SERPL-SCNC: 103 MMOL/L — SIGNIFICANT CHANGE UP (ref 98–107)
CO2 SERPL-SCNC: 23 MMOL/L — SIGNIFICANT CHANGE UP (ref 22–31)
CREAT SERPL-MCNC: 0.66 MG/DL — SIGNIFICANT CHANGE UP (ref 0.5–1.3)
EGFR: 112 ML/MIN/1.73M2 — SIGNIFICANT CHANGE UP
EOSINOPHIL # BLD AUTO: 0.14 K/UL — SIGNIFICANT CHANGE UP (ref 0–0.5)
EOSINOPHIL NFR BLD AUTO: 1.5 % — SIGNIFICANT CHANGE UP (ref 0–6)
GLUCOSE SERPL-MCNC: 227 MG/DL — HIGH (ref 70–99)
HCT VFR BLD CALC: 35.4 % — SIGNIFICANT CHANGE UP (ref 34.5–45)
HGB BLD-MCNC: 11 G/DL — LOW (ref 11.5–15.5)
IANC: 6.2 K/UL — SIGNIFICANT CHANGE UP (ref 1.8–7.4)
IMM GRANULOCYTES NFR BLD AUTO: 0.4 % — SIGNIFICANT CHANGE UP (ref 0–0.9)
LYMPHOCYTES # BLD AUTO: 2.66 K/UL — SIGNIFICANT CHANGE UP (ref 1–3.3)
LYMPHOCYTES # BLD AUTO: 27.7 % — SIGNIFICANT CHANGE UP (ref 13–44)
MCHC RBC-ENTMCNC: 24 PG — LOW (ref 27–34)
MCHC RBC-ENTMCNC: 31.1 GM/DL — LOW (ref 32–36)
MCV RBC AUTO: 77.1 FL — LOW (ref 80–100)
MONOCYTES # BLD AUTO: 0.5 K/UL — SIGNIFICANT CHANGE UP (ref 0–0.9)
MONOCYTES NFR BLD AUTO: 5.2 % — SIGNIFICANT CHANGE UP (ref 2–14)
NEUTROPHILS # BLD AUTO: 6.2 K/UL — SIGNIFICANT CHANGE UP (ref 1.8–7.4)
NEUTROPHILS NFR BLD AUTO: 64.7 % — SIGNIFICANT CHANGE UP (ref 43–77)
NRBC # BLD: 0 /100 WBCS — SIGNIFICANT CHANGE UP (ref 0–0)
NRBC # FLD: 0 K/UL — SIGNIFICANT CHANGE UP (ref 0–0)
PLATELET # BLD AUTO: 286 K/UL — SIGNIFICANT CHANGE UP (ref 150–400)
POTASSIUM SERPL-MCNC: 4.2 MMOL/L — SIGNIFICANT CHANGE UP (ref 3.5–5.3)
POTASSIUM SERPL-SCNC: 4.2 MMOL/L — SIGNIFICANT CHANGE UP (ref 3.5–5.3)
PROT SERPL-MCNC: 7 G/DL — SIGNIFICANT CHANGE UP (ref 6–8.3)
RBC # BLD: 4.59 M/UL — SIGNIFICANT CHANGE UP (ref 3.8–5.2)
RBC # FLD: 15.5 % — HIGH (ref 10.3–14.5)
SODIUM SERPL-SCNC: 138 MMOL/L — SIGNIFICANT CHANGE UP (ref 135–145)
WBC # BLD: 9.59 K/UL — SIGNIFICANT CHANGE UP (ref 3.8–10.5)
WBC # FLD AUTO: 9.59 K/UL — SIGNIFICANT CHANGE UP (ref 3.8–10.5)

## 2024-10-20 PROCEDURE — 99223 1ST HOSP IP/OBS HIGH 75: CPT

## 2024-10-20 PROCEDURE — 99284 EMERGENCY DEPT VISIT MOD MDM: CPT | Mod: GC

## 2024-10-20 RX ORDER — PREDNISONE 5 MG/1
40 TABLET ORAL ONCE
Refills: 0 | Status: COMPLETED | OUTPATIENT
Start: 2024-10-20 | End: 2024-10-20

## 2024-10-20 RX ORDER — SODIUM CHLORIDE IRRIG SOLUTION 0.9 %
1000 SOLUTION, IRRIGATION IRRIGATION
Refills: 0 | Status: DISCONTINUED | OUTPATIENT
Start: 2024-10-20 | End: 2024-10-23

## 2024-10-20 RX ORDER — CYCLOBENZAPRINE HCL 10 MG
10 TABLET ORAL ONCE
Refills: 0 | Status: COMPLETED | OUTPATIENT
Start: 2024-10-20 | End: 2024-10-20

## 2024-10-20 RX ORDER — INSULIN LISPRO 100/ML
VIAL (ML) SUBCUTANEOUS AT BEDTIME
Refills: 0 | Status: DISCONTINUED | OUTPATIENT
Start: 2024-10-20 | End: 2024-10-23

## 2024-10-20 RX ORDER — KETOROLAC TROMETHAMINE 10 MG/1
15 TABLET, FILM COATED ORAL ONCE
Refills: 0 | Status: DISCONTINUED | OUTPATIENT
Start: 2024-10-20 | End: 2024-10-20

## 2024-10-20 RX ORDER — ACETAMINOPHEN 325 MG
1000 TABLET ORAL ONCE
Refills: 0 | Status: COMPLETED | OUTPATIENT
Start: 2024-10-20 | End: 2024-10-20

## 2024-10-20 RX ORDER — LIDOCAINE 50 MG/G
1 CREAM TOPICAL ONCE
Refills: 0 | Status: COMPLETED | OUTPATIENT
Start: 2024-10-20 | End: 2024-10-20

## 2024-10-20 RX ORDER — ALCOHOL ANTISEPTIC PADS
15 PADS, MEDICATED (EA) TOPICAL ONCE
Refills: 0 | Status: DISCONTINUED | OUTPATIENT
Start: 2024-10-20 | End: 2024-10-23

## 2024-10-20 RX ORDER — METHYLPREDNISOLONE ACETATE 80 MG/ML
1 INJECTION, SUSPENSION INTRA-ARTICULAR; INTRALESIONAL; INTRAMUSCULAR; SOFT TISSUE
Qty: 1 | Refills: 0
Start: 2024-10-20 | End: 2024-10-26

## 2024-10-20 RX ORDER — GLUCAGON INJECTION, SOLUTION 0.5 MG/.1ML
1 INJECTION, SOLUTION SUBCUTANEOUS ONCE
Refills: 0 | Status: DISCONTINUED | OUTPATIENT
Start: 2024-10-20 | End: 2024-10-23

## 2024-10-20 RX ORDER — ALCOHOL ANTISEPTIC PADS
25 PADS, MEDICATED (EA) TOPICAL ONCE
Refills: 0 | Status: DISCONTINUED | OUTPATIENT
Start: 2024-10-20 | End: 2024-10-23

## 2024-10-20 RX ORDER — ALCOHOL ANTISEPTIC PADS
12.5 PADS, MEDICATED (EA) TOPICAL ONCE
Refills: 0 | Status: DISCONTINUED | OUTPATIENT
Start: 2024-10-20 | End: 2024-10-23

## 2024-10-20 RX ORDER — INSULIN LISPRO 100/ML
VIAL (ML) SUBCUTANEOUS
Refills: 0 | Status: DISCONTINUED | OUTPATIENT
Start: 2024-10-20 | End: 2024-10-23

## 2024-10-20 RX ORDER — ATORVASTATIN CALCIUM 10 MG/1
10 TABLET, FILM COATED ORAL AT BEDTIME
Refills: 0 | Status: DISCONTINUED | OUTPATIENT
Start: 2024-10-20 | End: 2024-10-23

## 2024-10-20 RX ADMIN — Medication 400 MILLIGRAM(S): at 01:00

## 2024-10-20 RX ADMIN — KETOROLAC TROMETHAMINE 15 MILLIGRAM(S): 10 TABLET, FILM COATED ORAL at 01:00

## 2024-10-20 RX ADMIN — Medication 10 MILLIGRAM(S): at 01:02

## 2024-10-20 RX ADMIN — PREDNISONE 40 MILLIGRAM(S): 5 TABLET ORAL at 10:17

## 2024-10-20 RX ADMIN — KETOROLAC TROMETHAMINE 15 MILLIGRAM(S): 10 TABLET, FILM COATED ORAL at 10:16

## 2024-10-20 RX ADMIN — LIDOCAINE 1 PATCH: 50 CREAM TOPICAL at 01:01

## 2024-10-20 RX ADMIN — Medication 1: at 07:59

## 2024-10-20 NOTE — CONSULT NOTE ADULT - SUBJECTIVE AND OBJECTIVE BOX
Neurology - Consult Note    Spectra: [phone] ([facility]), [phone] (J)    HPI: 44-year-old R handed woman, Armenian speaking, with history of diabetes, presenting with sensory changes in right upper extremity.    Family assisting with interpretation at bedside.     10/12-10/13- presented to CDU for ten days of pain in the R neck radiating to the R shoulder and R arm, obtained MR brain + c-spine w/w/o contrast demonstrating mild to moderate C5-6 central canal and right foraminal stenoses, C7-T1 herniated disc, without significant encroachment.    Patient and family endorse significant RUE pain, back pain which has worsened since 10/13.  Patient has been mostly in bed since 10/13 due to the pain.  On day of presentation, patient presented with EMS as code for RUE numbness. Around 10am noticed fluctuating numbness in the RUE. Reportedly episodic in nature lasting about 30 minutes, in different regions of the RUE. All numbness is preceded by more severe pain in that location of the arm.   Denies weakness. Denies facial numbness/weakness, facial leg numbness/weakness. No changes in speech/vision.  No recent injuries.    LKW 1000 on 10/20  MRS 0  NIHSS 1  BP 140s  AC/AP none    Review of Systems:   NEUROLOGICAL: +As stated in HPI above  All other review of systems is negative unless indicated above.    Allergies:  No Known Allergies      PMHx/PSHx/Family Hx: As above, otherwise see below   No pertinent past medical history  Insulin controlled gestational diabetes mellitus (GDM) in first trimester  Pregnancy  Miscarriage  Sciatica of right side        Social Hx:  as above    Vitals:  T(C): 37 (10-19-24 @ 23:18), Max: 37 (10-19-24 @ 23:18)  HR: 78 (10-19-24 @ 23:18) (78 - 78)  BP: 145/72 (10-19-24 @ 23:18) (145/72 - 145/72)  RR: 17 (10-19-24 @ 23:18) (17 - 17)  SpO2: 98% (10-19-24 @ 23:18) (98% - 98%)    Physical Examination:   General - non-toxic appearing female  Neurologic Exam:  Mental status - Awake, Alert, Oriented to person, place, and time. Speech fluent, repetition and naming intact. Follows simple commands.  Cranial nerves - PERRLA (4mm -> 3mm b/l), VFF, EOMI, face sensation (V1-V3) intact b/l, facial strength intact without asymmetry b/l, hearing intact b/l, palate with symmetric elevation,  sternocleidomastiod 5/5 strength b/l, tongue midline on protrusion with full lateral movement  Motor - Normal bulk and tone throughout. No pronator drift.  Strength testing 5/5 in b/l UE, LE, though slowed motion in RUE and limited by pain  Sensation - Light touch/vibration/pinprick/cold temperature reported decreased in RUE compared to LUE. Sensation improved on ventral forearm compared to dorsal forearm in RUE. Light touch intact throughout b/l LE  DTR's -             Biceps      Triceps     Brachioradialis      Patellar    Ankle    Toes  R             0+             0+                  1+                       1+            1+                  L              1+             0+                 2+                        1+           1+                 -han b/l  no pectoralis reflex b/l  Coordination - Finger to Nose intact LUE, LATHA RUE given pain. No tremors appreciated. Heel-shin intact.  Gait and station - [facility]    Labs:                        11.0   9.59  )-----------( 286      ( 19 Oct 2024 23:40 )             35.4     10-19    138  |  103  |  10  ----------------------------<  227[H]  4.2   |  23  |  0.66    Ca    9.0      19 Oct 2024 23:40    TPro  7.0  /  Alb  4.0  /  TBili  0.3  /  DBili  x   /  AST  12  /  ALT  15  /  AlkPhos  91  10-19    CAPILLARY BLOOD GLUCOSE  221 (20 Oct 2024 02:00)      POCT Blood Glucose.: 221 mg/dL (19 Oct 2024 23:24)    LIVER FUNCTIONS - ( 19 Oct 2024 23:40 )  Alb: 4.0 g/dL / Pro: 7.0 g/dL / ALK PHOS: 91 U/L / ALT: 15 U/L / AST: 12 U/L / GGT: x           Radiology:  MR brain w/w/o IV contrast  IMPRESSION:  1.  Motion degraded exam without gross evidence of acute ischemia.    MR c-spine w/w/o IV contrast  1.  Mild to moderate C5-6 central canal and right foraminal stenoses.  2.  C7-T1 herniated disc, without significant encroachment. Neurology - Consult Note    Spectra: [phone] ([facility]), [phone] (J)    HPI: 44-year-old R handed woman, Icelandic speaking, with history of diabetes, presenting with sensory changes in right upper extremity.    Family assisting with interpretation at bedside.     10/12-10/13- presented to CDU for ten days of pain in the R neck radiating to the R shoulder and R arm, obtained MR brain + c-spine w/w/o contrast demonstrating mild to moderate C5-6 central canal and right foraminal stenoses, C7-T1 herniated disc, without significant encroachment.    Patient and family endorse significant RUE pain, back pain which has worsened since 10/13.  Patient has been mostly in bed since 10/13 due to the pain.  On day of presentation, patient presented with EMS as code for RUE numbness. Around 10am noticed fluctuating numbness in the RUE. Reportedly episodic in nature lasting about 30 minutes, in different regions of the RUE. All numbness is preceded by more severe pain in that location of the arm.   Denies weakness. Denies facial numbness/weakness, facial leg numbness/weakness. No changes in speech/vision.  No recent injuries.    LKW 1000 on 10/20  MRS 0  NIHSS 1  BP 140s  AC/AP none    Review of Systems:   NEUROLOGICAL: +As stated in HPI above  All other review of systems is negative unless indicated above.    Allergies:  No Known Allergies      PMHx/PSHx/Family Hx: As above, otherwise see below   No pertinent past medical history  Insulin controlled gestational diabetes mellitus (GDM) in first trimester  Pregnancy  Miscarriage  Sciatica of right side        Social Hx:  as above    Vitals:  T(C): 37 (10-19-24 @ 23:18), Max: 37 (10-19-24 @ 23:18)  HR: 78 (10-19-24 @ 23:18) (78 - 78)  BP: 145/72 (10-19-24 @ 23:18) (145/72 - 145/72)  RR: 17 (10-19-24 @ 23:18) (17 - 17)  SpO2: 98% (10-19-24 @ 23:18) (98% - 98%)    Physical Examination:   General - non-toxic appearing female  Neurologic Exam:  Mental status - Awake, Alert, Oriented to person, place, and time. Speech fluent, repetition and naming intact. Follows simple commands.  Cranial nerves - PERRLA (4mm -> 3mm b/l), VFF, EOMI, face sensation (V1-V3) intact b/l, facial strength intact without asymmetry b/l, hearing intact b/l, palate with symmetric elevation,  sternocleidomastiod 5/5 strength b/l, tongue midline on protrusion with full lateral movement  Motor - Normal bulk and tone throughout. No pronator drift.  Strength testing 5/5 in b/l UE, LE, though slowed motion in RUE and limited by pain  Sensation - Light touch/vibration/pinprick/cold temperature reported decreased in RUE compared to LUE. Sensation improved on ventral forearm compared to dorsal forearm in RUE. Light touch intact throughout b/l LE  DTR's -             Biceps      Triceps     Brachioradialis        R             0+             0+                  1+                               L              1+             0+                 2+                                     -han b/l  no pectoralis reflex b/l  Coordination - Finger to Nose intact LUE, LATHA RUE given pain. No tremors appreciated. Heel-shin intact.  Gait and station - [facility]    Labs:                        11.0   9.59  )-----------( 286      ( 19 Oct 2024 23:40 )             35.4     10-19    138  |  103  |  10  ----------------------------<  227[H]  4.2   |  23  |  0.66    Ca    9.0      19 Oct 2024 23:40    TPro  7.0  /  Alb  4.0  /  TBili  0.3  /  DBili  x   /  AST  12  /  ALT  15  /  AlkPhos  91  10-19    CAPILLARY BLOOD GLUCOSE  221 (20 Oct 2024 02:00)      POCT Blood Glucose.: 221 mg/dL (19 Oct 2024 23:24)    LIVER FUNCTIONS - ( 19 Oct 2024 23:40 )  Alb: 4.0 g/dL / Pro: 7.0 g/dL / ALK PHOS: 91 U/L / ALT: 15 U/L / AST: 12 U/L / GGT: x           Radiology:  MR brain w/w/o IV contrast  IMPRESSION:  1.  Motion degraded exam without gross evidence of acute ischemia.    MR c-spine w/w/o IV contrast  1.  Mild to moderate C5-6 central canal and right foraminal stenoses.  2.  C7-T1 herniated disc, without significant encroachment. Neurology - Consult Note    Spectra: [phone] ([facility]), [phone] (J)    HPI: 44-year-old R handed woman, Italian speaking, with history of diabetes, presenting with sensory changes in right upper extremity.    Family assisting with interpretation at bedside.     10/12-10/13- presented to CDU for ten days of pain in the R neck radiating to the R shoulder and R arm, obtained MR brain + c-spine w/w/o contrast demonstrating mild to moderate C5-6 central canal and right foraminal stenoses, C7-T1 herniated disc, without significant encroachment.    Patient and family endorse significant RUE pain, back pain which has worsened since 10/13.  Patient has been mostly in bed since 10/13 due to the pain.  On day of presentation, patient presented with EMS as code for RUE numbness. Around 10am noticed fluctuating numbness in the RUE. Reportedly episodic in nature lasting about 30 minutes, in different regions of the RUE. All numbness is preceded by more severe pain in that location of the arm.   Denies weakness. Denies facial numbness/weakness, facial leg numbness/weakness. No changes in speech/vision.  No recent injuries.    LKW 1000 on 10/20  MRS 0  NIHSS 1  BP 140s  AC/AP none    Review of Systems:   NEUROLOGICAL: +As stated in HPI above  All other review of systems is negative unless indicated above.    Allergies:  No Known Allergies      PMHx/PSHx/Family Hx: As above, otherwise see below   No pertinent past medical history  Insulin controlled gestational diabetes mellitus (GDM) in first trimester  Pregnancy  Miscarriage  Sciatica of right side        Social Hx:  as above    Vitals:  T(C): 37 (10-19-24 @ 23:18), Max: 37 (10-19-24 @ 23:18)  HR: 78 (10-19-24 @ 23:18) (78 - 78)  BP: 145/72 (10-19-24 @ 23:18) (145/72 - 145/72)  RR: 17 (10-19-24 @ 23:18) (17 - 17)  SpO2: 98% (10-19-24 @ 23:18) (98% - 98%)    Physical Examination:   General - non-toxic appearing female  Neurologic Exam:  Mental status - Awake, Alert, Oriented to person, place, and time. Speech fluent, repetition and naming intact. Follows simple commands.  Cranial nerves - PERRLA (4mm -> 3mm b/l), VFF, EOMI, face sensation (V1-V3) intact b/l, facial strength intact without asymmetry b/l, hearing intact b/l, palate with symmetric elevation,  sternocleidomastiod 5/5 strength b/l, tongue midline on protrusion with full lateral movement  Motor - Normal bulk and tone throughout. No pronator drift.  Strength testing 5/5 in b/l UE, LE, though slowed motion in RUE and limited by pain  Sensation - Light touch/vibration/pinprick/cold temperature reported decreased in RUE compared to LUE. Sensation improved on ventral forearm compared to dorsal forearm in RUE. Light touch intact throughout b/l LE  DTR's -             Biceps      Triceps     Brachioradialis   Patellars   Ankle     R             0+             0+                  1+                0+         0+  L              1+             0+                 2+                 0+        0+     -han b/l  no pectoralis reflex b/l  no significant clonus b/l  Coordination - Finger to Nose intact LUE, LATHA RUE given pain. No tremors appreciated. Heel-shin intact.  Gait and station - [facility]    Labs:                        11.0   9.59  )-----------( 286      ( 19 Oct 2024 23:40 )             35.4     10-19    138  |  103  |  10  ----------------------------<  227[H]  4.2   |  23  |  0.66    Ca    9.0      19 Oct 2024 23:40    TPro  7.0  /  Alb  4.0  /  TBili  0.3  /  DBili  x   /  AST  12  /  ALT  15  /  AlkPhos  91  10-19    CAPILLARY BLOOD GLUCOSE  221 (20 Oct 2024 02:00)      POCT Blood Glucose.: 221 mg/dL (19 Oct 2024 23:24)    LIVER FUNCTIONS - ( 19 Oct 2024 23:40 )  Alb: 4.0 g/dL / Pro: 7.0 g/dL / ALK PHOS: 91 U/L / ALT: 15 U/L / AST: 12 U/L / GGT: x           Radiology:  MR brain w/w/o IV contrast  IMPRESSION:  1.  Motion degraded exam without gross evidence of acute ischemia.    MR c-spine w/w/o IV contrast  1.  Mild to moderate C5-6 central canal and right foraminal stenoses.  2.  C7-T1 herniated disc, without significant encroachment.

## 2024-10-20 NOTE — ED CDU PROVIDER DISPOSITION NOTE - ATTENDING CONTRIBUTION TO CARE
Dr. De La Torre:  I performed a face to face bedside interview with patient regarding history of present illness, review of symptoms and past medical history. I completed an independent physical exam.  I have discussed patient's plan of care with PA.   I agree with note as stated above, having amended the EMR as needed to reflect my findings.   This includes HISTORY OF PRESENT ILLNESS, HIV, PAST MEDICAL/SURGICAL/FAMILY/SOCIAL HISTORY, ALLERGIES AND HOME MEDICATIONS, REVIEW OF SYSTEMS, PHYSICAL EXAM, and any PROGRESS NOTES during the time I functioned as the attending physician for this patient.

## 2024-10-20 NOTE — ED CDU PROVIDER DISPOSITION NOTE - CLINICAL COURSE
45 y/o female no pmh c/o RUE numbness. Pt was seen in the ER x 10 days ago for neck pain and RUE numbness. Pt stayed on the CDU and had an MRI showing disc herniations and dx with cervical radiculopathy. PT c/o worsening numbness last night. Pt was seen by neuro who rec to repeat an MRI of her C spine. Pt felt better after CDU stay and numbness improved. Pt was seen and eval by Neuro attending who did not recommend repeat MRI. Pt needs SpineSx close f/u and is stable for dc. WIll refer to spine center, dc on steroids.

## 2024-10-20 NOTE — ED CDU PROVIDER DISPOSITION NOTE - PATIENT PORTAL LINK FT
You can access the FollowMyHealth Patient Portal offered by [facility] by registering at the following website: [email or web address]. By joining [facility]’s FollowMyHealth portal, you will also be able to view your health information using other applications (apps) compatible with our system.

## 2024-10-20 NOTE — ED CDU PROVIDER INITIAL DAY NOTE - PROGRESS NOTE DETAILS
44F h/o DM presented to ED with RUE paresthesia.  Had similar presentation 10/12 and had MRI in CDU at that time showing: Mild to moderate C5-6 central canal and right foraminal stenoses. C7-T1 herniated disc, without significant encroachment.    Patient feels improved this morning.  Likely RUE paresthesia secondary to a radiculopathy.  Patient pending neuro final recs in CDU this morning. Pt feeling better after ER stay, RUE paresthesias have resolved. Pt seen and eval by Neuro attending this AM. Pt does not need repeat MRI, symptoms are likely from known herniated disc which was visualized on MRI last week. stable for dc.

## 2024-10-20 NOTE — ED PROVIDER NOTE - PHYSICAL EXAMINATION
General: well appearing, interactive, well nourished, no apparent distress, ncat  HEENT: EOMI, PERRLA, normal mucosa, normal oropharynx, no lesions on the lips or on oral mucosa, normal external ear  Neck: supple, no lymphadenopathy, full range of motion, no nuchal rigidity  CV: RRR, normal S1 and S2 with no murmur, capillary refill less than two seconds  Resp: lungs CTA b/l, good aeration bilaterally, symmetric chest wall   Abd: non-distended, soft, non-tender  : no CVA tenderness  MSK: full range of motion, no cyanosis, no edema, no clubbing, no immobility  Neuro: CN II-XII grossly intact, muscle strength 5/5 in all extremities, decreased silt in rue compared to lue  Skin: no rashes, skin intact

## 2024-10-20 NOTE — ED ADULT NURSE NOTE - OBJECTIVE STATEMENT
Break RN: 43yo female received in room 17. pt A&Ox4, ambulatory, hx of DMT2, herniated disc, c/o right arm numbness and pain intermittently since 10am this morning. Has similar symptoms with her herniated disc. Patient upper and lower extremities strength equal. No facial droopiness and slurred speech noted. Respiration even and non-labored. in NAD. Code stroke canceled. Pt received on soft neck brace. 20G IV placed to LAC, lab drawn and sent. EKG completed. Pending further order. Break RN: 45yo female received in room 17. pt A&Ox4, ambulatory, hx of DMT2, herniated disc, c/o right arm numbness and pain intermittently since 10am this morning with neck and back pain. Has similar symptoms with her herniated disc. Patient upper and lower extremities strength equal. No facial droopiness and slurred speech noted. Respiration even and non-labored. in NAD. Code stroke canceled. Pt received on soft neck brace. 20G IV placed to LAC, lab drawn and sent. EKG completed. Pending further order.

## 2024-10-20 NOTE — ED CDU PROVIDER INITIAL DAY NOTE - OBJECTIVE STATEMENT
44-year-old female with history of diabetes, hyperlipidemia presents to the emergency department complaining of right arm numbness and right-sided neck/shoulder pain.  Patient was seen in the emergency department 5 days ago and had an MRI for same symptoms.  Patient states her symptoms worsened today with increased numbness.  Pt denies chest pain, shortness of breath, injury, numbness, tingling, fevers, chills.  H&P obtained via  ID# [id number].

## 2024-10-20 NOTE — ED PROVIDER NOTE - ATTENDING CONTRIBUTION TO CARE
Harley Albrecht DO:  patient seen and evaluated with the resident.  I was present for key portions of the History & Physical, and I agree with the Impression & Plan. 43 yo f pmh dm, pw r arm paresthesia. from 10/12-13/24 pt was in cdu for similar sx on MRI found to have MRIs were performed: Mild to moderate C5-6 central canal and right foraminal stenoses. C7-T1 herniated disc, without significant encroachment.  Patient PW son bedside who provides collateral translation, official  declined.  Reports since 10 AM this morning patient has had right arm paresthesias, pain.  These are episodic in last about 30 minutes.  First there is pain followed by paresthesias.  Denies weakness.  Denies facial pain or weakness or leg pain or weakness.  Denies N/C,/C, recent trauma or neck manipulation.  Patient was activated as code stroke by triage RN, at the time my evaluation with neurology, we can sided decided to DC code stroke given this is more consistent with her known disc herniations.  Patient only found to have mild sensory deficit in right upper extremity, no motor deficit.  Do not suspect cord compression, cauda equina.  Pending full neuroconsult would likely MRI and reassessment.  Pain control. Harley Albrecht DO:  patient seen and evaluated with the resident.  I was present for key portions of the History & Physical, and I agree with the Impression & Plan. 43 yo f pmh dm, pw r arm paresthesia. from 10/12-13/24 pt was in cdu for similar sx on MRI found to have MRIs were performed: Mild to moderate C5-6 central canal and right foraminal stenoses. C7-T1 herniated disc, without significant encroachment.  Patient PW son bedside who provides collateral translation, official  declined.  Reports since 10 AM this morning patient has had right arm paresthesias, pain.  These are episodic in last about 30 minutes.  First there is pain followed by paresthesias.  Denies weakness.  Denies facial pain or weakness or leg pain or weakness.  Denies N/C,/C, recent trauma or neck manipulation.  Patient was activated as code stroke by triage RN, at the time my evaluation with neurology, we can sided decided to DC code stroke given this is more consistent with her known disc herniations.  Patient only found to have mild sensory deficit in right upper extremity, NO motor deficit.  Do not suspect cord compression, cauda equina.  Pending full neuroconsult would likely MRI and reassessment.  Pain control.

## 2024-10-20 NOTE — ED ADULT NURSE REASSESSMENT NOTE - NS ED NURSE REASSESS COMMENT FT1
Pt laying in bed, NAD, respirations even and unlabored, VS in flow sheet. Pt offers no complaints at this time. Report given to CDU RN, transport by PCA to CDU 4.

## 2024-10-20 NOTE — CONSULT NOTE ADULT - ASSESSMENT
ASSESSMENT   44-year-old R handed woman, Persian speaking, with history of diabetes, presenting with sensory changes in right upper extremity. Prior MR brain + c-spine w/w/o contrast demonstrating mild to moderate C5-6 central canal and right foraminal stenoses, C7-T1 herniated disc, without significant encroachment, pain worsening since last visit 10/13.  Code stroke called for sensory changes.  Not candidate for tenecteplase as out of window.  Not candidate for thrombectomy given low suspicion for LVO.  LKW 1000 on 10/20  MRS 0  NIHSS 1  BP 140s  AC/AP none    IMPRESSION   RUE pain followed by numbness, fluctuating in duration on day of presentation. Prior MR imaging demonstrating R foraminal + central canal stenosis, herniated disc in C7-T1. Possibly cervical radiculopathy from disc herniations. Low suspicion of ischemic etiology.    RECOMMENDATION   [] MR cervical spine w/w/o contrast  [] NSGY evaluation  [] PT/OT    Neurovascular component/stroke code component discussed with telestroke attending  Patient to be seen by team and attending. Note finalized upon attending attestation.  ASSESSMENT   44-year-old R handed woman, Upper sorbian speaking, with history of diabetes, presenting with sensory changes in right upper extremity. Prior MR brain + c-spine w/w/o contrast demonstrating mild to moderate C5-6 central canal and right foraminal stenoses, C7-T1 herniated disc, without significant encroachment, pain worsening since last visit 10/13.  Code stroke called for sensory changes.  Not candidate for tenecteplase as out of window.  Not candidate for thrombectomy given low suspicion for LVO.  LKW 1000 on 10/20  MRS 0  NIHSS 1  BP 140s  AC/AP none    IMPRESSION   RUE pain followed by numbness, fluctuating in duration on day of presentation. Prior MR imaging demonstrating R foraminal + central canal stenosis, herniated disc in C7-T1. Possibly cervical radiculopathy from disc herniations. Low suspicion of ischemic etiology.    RECOMMENDATION   [] MR cervical spine w/w/o contrast  [] NSGY evaluation  [] PT/OT  [] pain mngmt per primary team    Neurovascular component/stroke code component discussed with telestroke attending.  Patient to be seen by team and attending. Note finalized upon attending attestation.

## 2024-10-20 NOTE — CONSULT NOTE ADULT - ATTENDING COMMENTS
During the evaluation, the patient's son was present, and I appreciated his involvement and support. Urdu : Son at bedside.    I interviewed the patient, discussed the case with the Resident and agree with the findings and plan as documented in the Resident's note except below.  Ms. Concepcion is [id number] right handed woman, Urdu speaking, with history of diabetes, presenting with neck pain and numbness & tingling sensation  in right upper extremity.  MRI Spine was done on 10/12 as per report mild to moderate C5-6 central canal and right foraminal stenoses.  C7-T1 herniated disc, without significant encroachment.    Patient would benefit from Spine surgery evaluation   Pain management consult   Continue medical management, neuro- check and fall precaution.  GI and DVT prophylaxis.  I discussed the diagnosis and treatment plan with the patient.  All questions and concerns were addressed. The patient demonstrated good understanding of the treatment plan.  My cumulative time taking care of this patient is 70 minutes  If you have any further questions, please do not hesitate to contact our consult service.  Thank you for allowing us to participate in this patient care.

## 2024-10-20 NOTE — ED CDU PROVIDER DISPOSITION NOTE - NSFOLLOWUPINSTRUCTIONS_ED_ALL_ED_FT
Cervical Radiculopathy    WHAT YOU NEED TO KNOW:    What is cervical radiculopathy? Cervical radiculopathy is a painful condition that happens when a spinal nerve in your neck is pinched or irritated.  Vertebral Column    What causes cervical radiculopathy? Changes in the vertebrae (bones) and discs in your neck can put pressure on a spinal nerve. Discs are natural, spongy cushions between your vertebrae that allow your spine to move. The following can cause a pinched nerve:    Disc damage may occur if a disc flattens, bulges, or moves over time. An injury can also cause disc damage.    Cervical spondylosis is when the vertebrae in your neck break down. This normally occurs as you age.    Growths, such as tumors or cysts (fluid-filled lumps), may grow and press on the nerve.  What are the signs and symptoms of cervical radiculopathy? The most common symptom is sharp pain that travels from your neck all the way down your arm. You may have pain in your shoulder, chest, and hand. The pain may get worse with movement or when you cough or sneeze. You may also have any of the following:    Burning or tingling sensations in your neck or arm    Numbness or weakness in your arm or hand that makes it hard for you to  objects    Headaches  How is cervical radiculopathy diagnosed? Your healthcare provider will ask when and how your symptoms began. Your provider will gently press on your neck to check for tenderness and areas that are not shaped correctly. Your provider will also check your arms and hands for numbness or weakness. You may have any of the following:    Provocative tests are done to check your response to certain movements. Your provider will ask you to move your neck, shoulder, and arm in different ways. Some movements will increase your symptoms, while others will make you feel better.    An x-ray is a picture of the bones and tissues in your neck.    An MRI or a CT scan may be used to take pictures of your neck. The pictures can show problems and changes in your nerves, discs, and vertebrae. You may be given contrast liquid to help the pictures show up better. Tell the provider if you have ever had an allergic reaction to contrast liquid. Do not enter the MRI room with anything metal. Metal can cause serious injury. Tell the provider if you have any metal in or on your body.    An electromyography is also called an EMG. An EMG is done to test the function of your muscles and the nerves that control them. Electrodes (wires) are placed on the muscle being tested. Needles may be attached to the electrodes and placed in your skin. The electrical activity of your muscles and nerves is measured by a machine attached to the electrodes. Your muscles are tested at rest and during movement.  How is cervical radiculopathy treated?    NSAIDs decrease swelling and pain. This medicine can be bought without a doctor's order. This medicine can cause stomach bleeding or kidney problems in certain people. If you take blood thinner medicine, always ask your provider if NSAIDs are safe for you. Always read the medicine label and follow the directions on it before using this medicine.    Prescription pain medicine may be given to decrease pain. Do not wait until the pain is severe before you take this medicine.    Steroids help decrease pain and swelling. These may be given as a pill or as an injection in your neck. You may need more than 1 injection if your symptoms do not improve after the first treatment.    Physical therapy helps stretch and strengthen your muscles. Your physical therapist can teach you how to improve your posture and the way you hold your neck. The therapist may also teach you how to be safely active and avoid further injury. The therapist can also help you develop an exercise program that is safe for your back and neck.    Surgery may be used to treat a pinched nerve if other treatments have not helped after 6 to 12 weeks.  How can I manage my symptoms?    Ice helps decrease swelling and pain. Ice may also help prevent tissue damage. Use an ice pack, or put crushed ice in a plastic bag. Cover it with a towel and place it on your neck for 15 to 20 minutes every hour or as directed.    Rest when you feel it is needed. Slowly start to do more each day. Return to your daily activities as directed.    Wear a soft collar. You may be given a soft collar to support your neck while you sleep. Wear the soft collar only as directed.  Cervical Collars      Do light stretches and regular exercise. Your provider may suggest light stretches to help decrease stiffness in your neck and arm as you recover. After your pain is controlled, you may benefit from regular exercise. Ask what type of exercise is safe for your back and neck.    Review your work area. A comfortable work area can help prevent neck strain. Ask your employer for an ergonomic review to check the position of your desk, chair, phone, and computer. Make any necessary adjustments for your comfort.  When should I contact my healthcare provider?    You are losing weight without trying.    Your pain is worse, even with medicine.    One or both hands feel more numb than before, or you cannot move your fingers well.    You have questions or concerns about your condition or care.  CARE AGREEMENT:    You have the right to help plan your care. Learn about your health condition and how it may be treated. Discuss treatment options with your healthcare providers to decide what care you want to receive. You always have the right to refuse treatment.

## 2024-10-20 NOTE — ED ADULT NURSE NOTE - BEFAST ARM NUMBNESS
Type of Form Received: order    Form Received (Date) 3/28/23   Form Filled out No   Placed in provider folder Yes     
Yes

## 2024-10-20 NOTE — ED CDU PROVIDER INITIAL DAY NOTE - PSYCHIATRIC, MLM
OB - Chief Complaint & HPI


Date/Time


Date of Admission:


Date of Admission:  2018 at 06:09


Time Seen by Provider:  07:10





Chief Complaint/History


OB-Reason for Admission/Chief:  Induction of Labor


Hx :  3


Hx Para:  2


Expected Date of Delivery:  2018


Gestational Age in Weeks:  39





Allergies and Home Medications


Allergies


Coded Allergies:  


     No Known Drug Allergies (Unverified , 10/12/15)





Home Medications


Levetiracetam 500 Mg Tablet, 500 MG PO BID


   Prescribed by: ARJUN KNOWLES on 1/10/17 0658





Patient Home Medication List


Home Medication List Reviewed:  Yes





OB - History


Hx of Present Pregnancy


Prenatal Care:  Yes


Ultrasounds:  Normal mid trimester US


Obstetrical Complications:  None


Medical Complications:  None





Obstetrical History


Hx Pregnancy Termination:  No


Hx Stillbirth:  Yes (@ 36 weeks, last pregnacies)





Delivery History


Hx Blood Disorders:  No


Adverse Rxn to Tranfusion:  No





Patient Past Medical History





No chronic medical problems





Social History/Family History


Recent Infectious Disease Expo:  No


Alcohol Use:  Denies Use


Recreational Drug Use:  No


2nd Hand Smoke Exposure:  No





Immunizations


Tetanus Booster (TDap):  Unknown





OB - Admission Exam


Physical Exam


Vitals:





Vital Signs








 18





 06:29


 


Temp 97.3


 


Pulse 82


 


Resp 18


 


B/P (MAP) 148/87 (107)


 


O2 Delivery Room Air








HEENT:  Moist Membranes


Lungs:  Clear


Cervical Dilatation:  2cm


Effacement:  50%


Station:  -3


Membranes:  Intact (on admit)


Amniotic Fluid:  Clear


Fetal Heart Rate:  130's


Accelerations:  Accelerations Present


Short Term Variability:  Present





Dickerson Scoring Tool (Modified)


Dilation (cm):  1-2cm (1)


Effacement (%):  31-51%  (1)


Fetal Descent/Station:  -3        (0)


Cervix Consistency:  Soft      (2)


Cervix Position:  Middle/Mid-Position  (1)


Dickerson Score:  7


Labs





Laboratory Tests








Test


 18


06:20 Range/Units


 


 


White Blood Count


 10.6 


 4.3-11.0


10^3/uL


 


Red Blood Count


 4.13 L


 4.35-5.85


10^6/uL


 


Hemoglobin 10.0 L 11.5-16.0  G/DL


 


Hematocrit 31 L 35-52  %


 


Mean Corpuscular Volume 75 L 80-99  FL


 


Mean Corpuscular Hemoglobin 24 L 25-34  PG


 


Mean Corpuscular Hemoglobin


Concent 32 


 32-36  G/DL





 


Red Cell Distribution Width 15.1 H 10.0-14.5  %


 


Platelet Count


 258 


 130-400


10^3/uL


 


Mean Platelet Volume 11.3 H 7.4-10.4  FL


 


Neutrophils (%) (Auto) 74  42-75  %


 


Lymphocytes (%) (Auto) 18  12-44  %


 


Monocytes (%) (Auto) 8  0-12  %


 


Eosinophils (%) (Auto) 1  0-10  %


 


Basophils (%) (Auto) 0  0-10  %


 


Neutrophils # (Auto) 7.8  1.8-7.8  X 10^3


 


Lymphocytes # (Auto) 1.9  1.0-4.0  X 10^3


 


Monocytes # (Auto) 0.8  0.0-1.0  X 10^3


 


Eosinophils # (Auto)


 0.1 


 0.0-0.3


10^3/uL


 


Basophils # (Auto)


 0.0 


 0.0-0.1


10^3/uL











OB - Assessment/Plan/Diagnosis


Assessment


Assessment:  induction of labor


Admission Dx


1.   IUP at term 39 weeks.


Admission Status:  Inpatient Order (span 2 midnights)


Reason for Inpatient Admission:  


labor managment





Plan


Plan:  Induction


Induction Method:  AROM


Other Plan


desires epidural











CANDELARIA COOPER MD 2018 08:11
Alert and oriented to person, place, time/situation. normal mood and affect. no apparent risk to self or others.

## 2024-10-20 NOTE — ED CDU PROVIDER INITIAL DAY NOTE - DETAILS
44-year-old female with history of diabetes, hyperlipidemia presents to the emergency department complaining of right arm numbness and right-sided neck/shoulder pain.  Patient was seen in the emergency department 5 days ago and had an MRI for same symptoms.  Patient states her symptoms worsened today with increased numbness.  Patient placed in CDU for MRI with neurology following.

## 2024-10-20 NOTE — ED CDU PROVIDER INITIAL DAY NOTE - ATTENDING APP SHARED VISIT CONTRIBUTION OF CARE
Harley Albrecht DO:  patient seen and evaluated with the PA. I was present for key portions of the History & Physical, and I agree with the Impression & Plan. 45 yo f pmh dm, pw r arm paresthesia. from 10/12-13/24 pt was in cdu for similar sx on MRI found to have MRIs were performed: Mild to moderate C5-6 central canal and right foraminal stenoses. C7-T1 herniated disc, without significant encroachment.  Patient PW son bedside who provides collateral translation, official  declined.  Reports since 10 AM this morning patient has had right arm paresthesias, pain.  These are episodic in last about 30 minutes.  First there is pain followed by paresthesias.  Denies weakness.  Denies facial pain or weakness or leg pain or weakness.  Denies N/v, f/C, recent trauma or neck manipulation.  Patient was activated as code stroke by triage RN, at the time my evaluation with neurology, we can sided decided to DC code stroke given this is more consistent with her known disc herniations.  Patient only found to have mild sensory deficit in right upper extremity, NO motor deficit.  Do not suspect cord compression, cauda equina.  cdu for neuro eval and likely mri.

## 2024-10-20 NOTE — ED ADULT NURSE NOTE - NSFALLUNIVINTERV_ED_ALL_ED
Bed/Stretcher in lowest position, wheels locked, appropriate side rails in place/Call bell, personal items and telephone in reach/Instruct patient to call for assistance before getting out of bed/chair/stretcher/Non-slip footwear applied when patient is off stretcher/[place] to call system/Physically safe environment - no spills, clutter or unnecessary equipment/Purposeful proactive rounding/Room/bathroom lighting operational, light cord in reach

## 2024-10-22 ENCOUNTER — APPOINTMENT (OUTPATIENT)
Dept: PHYSICAL MEDICINE AND REHAB | Facility: CLINIC | Age: 44
End: 2024-10-22
Payer: MEDICAID

## 2024-10-22 DIAGNOSIS — M54.12 RADICULOPATHY, CERVICAL REGION: ICD-10-CM

## 2024-10-22 DIAGNOSIS — M50.30 OTHER CERVICAL DISC DEGENERATION, UNSPECIFIED CERVICAL REGION: ICD-10-CM

## 2024-10-22 DIAGNOSIS — M50.20 OTHER CERVICAL DISC DISPLACEMENT, UNSPECIFIED CERVICAL REGION: ICD-10-CM

## 2024-10-22 PROCEDURE — 99203 OFFICE O/P NEW LOW 30 MIN: CPT

## 2024-10-22 RX ORDER — PRAVASTATIN SODIUM 10 MG/1
10 TABLET ORAL
Refills: 0 | Status: ACTIVE | COMMUNITY

## 2024-10-22 RX ORDER — GABAPENTIN 300 MG/1
300 CAPSULE ORAL 3 TIMES DAILY
Qty: 90 | Refills: 2 | Status: ACTIVE | COMMUNITY
Start: 2024-10-22 | End: 1900-01-01

## 2024-10-22 RX ORDER — ERGOCALCIFEROL (VITAMIN D2) 1250 MCG
50000 CAPSULE ORAL
Refills: 0 | Status: ACTIVE | COMMUNITY

## 2024-10-22 RX ORDER — METFORMIN HYDROCHLORIDE 500 MG/1
500 TABLET, COATED ORAL
Refills: 0 | Status: ACTIVE | COMMUNITY

## 2024-11-04 NOTE — ED ADULT NURSE REASSESSMENT NOTE - AS TEMP SITE
Chief Complaint   Patient presents with    Office Visit    Derm Problem     History of Present Illness:    Dez Blue presents with:    Complaint:  skin lesion(s)  Duration:  2 years   Location:  L cheek   Pertinent Past treatments: none   Other: gets bigger, itching, some pain     Personal/Family Hx of Skin Cancer   She does not  have a personal history of skin cancer;  Patient does not  have a first-degree relative with melanoma.    Physical Examination:    Focused Skin Examination: exam of specific area(s) of concern at the patient's direction was performed, showin.2 cm superficial, subcutaneous nodule present on the Left inferior cheek   Following verbal consent a photo was taken and placed in the patient's chart    Assessment and Plan:    Diagnoses and all orders for this visit:  EIC (epidermal inclusion cyst)  -     SERVICE TO SURGERY FACIAL PLASTICS     Discussed etiology, chronicity, treatment options, and prognosis for EIC.  We'll refer her to plastics for excisional removal.    Procedures Performed:    None     Return if symptoms worsen or fail to improve.     On 2024, IAshly PA-C scribed the services personally performed by Ashly Payne PA-C  The documentation recorded by the scribe accurately and completely reflects the service(s) I personally performed and the decisions made by me.     
oral

## 2024-11-26 ENCOUNTER — APPOINTMENT (OUTPATIENT)
Dept: PHYSICAL MEDICINE AND REHAB | Facility: CLINIC | Age: 44
End: 2024-11-26
Payer: MEDICAID

## 2024-11-26 DIAGNOSIS — M50.30 OTHER CERVICAL DISC DEGENERATION, UNSPECIFIED CERVICAL REGION: ICD-10-CM

## 2024-11-26 DIAGNOSIS — M50.20 OTHER CERVICAL DISC DISPLACEMENT, UNSPECIFIED CERVICAL REGION: ICD-10-CM

## 2024-11-26 DIAGNOSIS — M54.12 RADICULOPATHY, CERVICAL REGION: ICD-10-CM

## 2024-11-26 PROCEDURE — 99213 OFFICE O/P EST LOW 20 MIN: CPT

## 2025-07-07 NOTE — ED CDU PROVIDER SUBSEQUENT DAY NOTE - CLINICAL SUMMARY MEDICAL DECISION MAKING FREE TEXT BOX
general
44-year-old female presents the ER with proximately 10 days of pain to the right neck rating down the right shoulder and right arm.  It is associated with weakness of the right hand.  She is right-hand dominant.  She says that culturally she always eats with her right hand and she is having difficulty eating because she cannot use her right hand.  She went to her primary care doctor who prescribed her methocarbamol and ibuprofen which did not help her pain.  She then revisited the same doctor methocarbamol and ibuprofen which did not help her pain.  She then revisited the same doctor who prescribed her Valium and dexamethasone.  She is currently taking his medications now but says that her hand is getting weaker.  She saw physiatrist who recommended ED evaluation for her right hand weakness.  Patient is a diabetic.  Pt was evaluated in the ED and sent to CDU for MRI imaging for further eval.  Of note, while in Intake area of ED, staff noted bedbug where pt was lying on stretcher; decontamination protocol followed; pt and family (pt's son) made aware; son states they live in an apt. building that has had a recurrent issue with bedbug infestation throughout the apartment building; stated will obtain  (again).  In the interim, pt objectively noted to be resting comfortably; pt has been clinically stable; no issues thus far.  MRIs were performed with no emergent findings noted per review of radiology report.  Pt with reported relief with Toradol.

## 2025-07-24 NOTE — OB NEONATOLOGY/PEDIATRICIAN DELIVERY SUMMARY - NSIMAGECHECK_OBGYN_ALL_OB
Emphasized regarding diet exercise lifestyle modification cutting back calorie intake carbohydrate intake and lose weight      No